# Patient Record
Sex: MALE | Race: WHITE | NOT HISPANIC OR LATINO
[De-identification: names, ages, dates, MRNs, and addresses within clinical notes are randomized per-mention and may not be internally consistent; named-entity substitution may affect disease eponyms.]

---

## 2021-02-03 ENCOUNTER — APPOINTMENT (OUTPATIENT)
Dept: INTERNAL MEDICINE | Facility: CLINIC | Age: 41
End: 2021-02-03

## 2021-03-02 ENCOUNTER — APPOINTMENT (OUTPATIENT)
Dept: INTERNAL MEDICINE | Facility: CLINIC | Age: 41
End: 2021-03-02
Payer: COMMERCIAL

## 2021-03-02 VITALS
OXYGEN SATURATION: 99 % | WEIGHT: 145.5 LBS | SYSTOLIC BLOOD PRESSURE: 99 MMHG | TEMPERATURE: 97.2 F | HEIGHT: 68 IN | DIASTOLIC BLOOD PRESSURE: 60 MMHG | HEART RATE: 53 BPM | BODY MASS INDEX: 22.05 KG/M2

## 2021-03-02 DIAGNOSIS — Z12.5 ENCOUNTER FOR SCREENING FOR MALIGNANT NEOPLASM OF PROSTATE: ICD-10-CM

## 2021-03-02 DIAGNOSIS — E03.9 HYPOTHYROIDISM, UNSPECIFIED: ICD-10-CM

## 2021-03-02 DIAGNOSIS — E78.9 DISORDER OF LIPOPROTEIN METABOLISM, UNSPECIFIED: ICD-10-CM

## 2021-03-02 DIAGNOSIS — R73.9 HYPERGLYCEMIA, UNSPECIFIED: ICD-10-CM

## 2021-03-02 DIAGNOSIS — Z20.822 CONTACT WITH AND (SUSPECTED) EXPOSURE TO COVID-19: ICD-10-CM

## 2021-03-02 PROBLEM — Z00.00 ENCOUNTER FOR PREVENTIVE HEALTH EXAMINATION: Status: ACTIVE | Noted: 2021-03-02

## 2021-03-02 PROCEDURE — 99385 PREV VISIT NEW AGE 18-39: CPT | Mod: 25

## 2021-03-02 PROCEDURE — 36415 COLL VENOUS BLD VENIPUNCTURE: CPT

## 2021-03-02 PROCEDURE — 99072 ADDL SUPL MATRL&STAF TM PHE: CPT

## 2021-03-02 NOTE — HEALTH RISK ASSESSMENT
[Patient declined mammogram] : Patient declined mammogram [Patient declined PAP Smear] : Patient declined PAP Smear [Patient declined bone density test] : Patient declined bone density test [Patient declined colonoscopy] : Patient declined colonoscopy [Good] : ~his/her~  mood as  good [] : No [No] : In the past 12 months have you used drugs other than those required for medical reasons? No [No falls in past year] : Patient reported no falls in the past year [0] : 2) Feeling down, depressed, or hopeless: Not at all (0) [[id number]] : 0 [Patient declined Low Dose CT Scan] : Patient declined Low Dose CT Scan [Patient declined Retinal Exam] : Patient declined Retinal Exam. [HIV Test offered] : HIV Test offered [Hepatitis C test offered] : Hepatitis C test offered

## 2021-03-02 NOTE — HISTORY OF PRESENT ILLNESS
[FreeTextEntry1] : np est [de-identified] : Mr. ANTHONY LOMBARDO is a 40 year male vegan, comes to the office for physical exam. Patient feels well and has no complaints at this time.

## 2021-03-03 LAB
25(OH)D3 SERPL-MCNC: 16.3 NG/ML
ALBUMIN SERPL ELPH-MCNC: 4.4 G/DL
ALP BLD-CCNC: 62 U/L
ALT SERPL-CCNC: 11 U/L
ANION GAP SERPL CALC-SCNC: 11 MMOL/L
AST SERPL-CCNC: 16 U/L
BASOPHILS # BLD AUTO: 0.04 K/UL
BASOPHILS NFR BLD AUTO: 0.8 %
BILIRUB SERPL-MCNC: 0.4 MG/DL
BUN SERPL-MCNC: 18 MG/DL
CALCIUM SERPL-MCNC: 9.8 MG/DL
CHLORIDE SERPL-SCNC: 102 MMOL/L
CHOLEST SERPL-MCNC: 158 MG/DL
CO2 SERPL-SCNC: 26 MMOL/L
CREAT SERPL-MCNC: 0.72 MG/DL
EOSINOPHIL # BLD AUTO: 0.12 K/UL
EOSINOPHIL NFR BLD AUTO: 2.4 %
ESTIMATED AVERAGE GLUCOSE: 94 MG/DL
FOLATE SERPL-MCNC: 12.3 NG/ML
GLUCOSE SERPL-MCNC: 92 MG/DL
HBA1C MFR BLD HPLC: 4.9 %
HCT VFR BLD CALC: 37.4 %
HCV AB SER QL: NONREACTIVE
HCV S/CO RATIO: 0.09 S/CO
HDLC SERPL-MCNC: 66 MG/DL
HGB BLD-MCNC: 13.1 G/DL
HIV1+2 AB SPEC QL IA.RAPID: NONREACTIVE
IMM GRANULOCYTES NFR BLD AUTO: 0.2 %
IRON SATN MFR SERPL: 27 %
IRON SERPL-MCNC: 75 UG/DL
LDLC SERPL CALC-MCNC: 75 MG/DL
LYMPHOCYTES # BLD AUTO: 1.84 K/UL
LYMPHOCYTES NFR BLD AUTO: 37 %
MAN DIFF?: NORMAL
MCHC RBC-ENTMCNC: 33.6 PG
MCHC RBC-ENTMCNC: 35 GM/DL
MCV RBC AUTO: 95.9 FL
MONOCYTES # BLD AUTO: 0.46 K/UL
MONOCYTES NFR BLD AUTO: 9.3 %
NEUTROPHILS # BLD AUTO: 2.5 K/UL
NEUTROPHILS NFR BLD AUTO: 50.3 %
NONHDLC SERPL-MCNC: 92 MG/DL
PLATELET # BLD AUTO: 279 K/UL
POTASSIUM SERPL-SCNC: 4.7 MMOL/L
PROT SERPL-MCNC: 7.5 G/DL
PSA SERPL-MCNC: 0.56 NG/ML
RBC # BLD: 3.9 M/UL
RBC # FLD: 12.3 %
SARS-COV-2 IGG SERPL IA-ACNC: 137 INDEX
SARS-COV-2 IGG SERPL QL IA: POSITIVE
SODIUM SERPL-SCNC: 139 MMOL/L
TIBC SERPL-MCNC: 277 UG/DL
TRIGL SERPL-MCNC: 87 MG/DL
TSH SERPL-ACNC: 2.91 UIU/ML
UIBC SERPL-MCNC: 203 UG/DL
VIT B12 SERPL-MCNC: 604 PG/ML
WBC # FLD AUTO: 4.97 K/UL

## 2021-05-28 ENCOUNTER — APPOINTMENT (OUTPATIENT)
Dept: INTERNAL MEDICINE | Facility: CLINIC | Age: 41
End: 2021-05-28
Payer: COMMERCIAL

## 2021-05-28 VITALS
TEMPERATURE: 97 F | DIASTOLIC BLOOD PRESSURE: 56 MMHG | HEART RATE: 63 BPM | HEIGHT: 68 IN | SYSTOLIC BLOOD PRESSURE: 96 MMHG | RESPIRATION RATE: 16 BRPM | BODY MASS INDEX: 22.43 KG/M2 | WEIGHT: 148 LBS

## 2021-05-28 DIAGNOSIS — H26.9 UNSPECIFIED CATARACT: ICD-10-CM

## 2021-05-28 PROCEDURE — 99072 ADDL SUPL MATRL&STAF TM PHE: CPT

## 2021-05-28 PROCEDURE — 99214 OFFICE O/P EST MOD 30 MIN: CPT

## 2021-05-28 NOTE — PLAN
[FreeTextEntry1] : Mr. ANTHONY LOMBARDO is a 40 year male comes to the office for preoperative evaluation. Patient denies fever, cough SOB. No other complaints at this time.  Patient has greater than 4 METS, is a low perioperative risk for Major adverse cardiac event for low risk procedure.No further testing indicated at this time. Patient is medically optimized for this procedure. \par \par Prior to appointment and during encounter with patient extensive medical records were reviewed including but not limited to, Hospital records records, out patient records, laboratory data and microbiology data \par In addition extensive time was also spent in reviewing diagnostic studies.\par \par Total encounter total time 30 mins\par >50% of time spent counseling/coordinating care\par \par Counseling included abnormal lab results, differential diagnoses, treatment options, risks and benefits, lifestyle changes, current condition, medications, and dose adjustments. \par The patient was interactive, attentive, asked questions, and verbalized understanding

## 2021-05-28 NOTE — HISTORY OF PRESENT ILLNESS
[No Pertinent Cardiac History] : no history of aortic stenosis, atrial fibrillation, coronary artery disease, recent myocardial infarction, or implantable device/pacemaker [No Pertinent Pulmonary History] : no history of asthma, COPD, sleep apnea, or smoking [No Adverse Anesthesia Reaction] : no adverse anesthesia reaction in self or family member [Chronic Anticoagulation] : no chronic anticoagulation [Chronic Kidney Disease] : no chronic kidney disease [Diabetes] : no diabetes [(Patient denies any chest pain, claudication, dyspnea on exertion, orthopnea, palpitations or syncope)] : Patient denies any chest pain, claudication, dyspnea on exertion, orthopnea, palpitations or syncope [Moderate (4-6 METs)] : Moderate (4-6 METs) [FreeTextEntry1] : Bilateral Cataract surgery [FreeTextEntry2] : 06/10/21 and 06/24/21 [FreeTextEntry3] : Dr. Townsend [FreeTextEntry4] : Mr. ANTHONY LOMBARDO is a 40 year male comes to the office for preoperative evaluation. Patient denies fever, cough SOB. No other complaints at this time.

## 2022-06-28 ENCOUNTER — TRANSCRIPTION ENCOUNTER (OUTPATIENT)
Age: 42
End: 2022-06-28

## 2022-06-28 ENCOUNTER — APPOINTMENT (OUTPATIENT)
Dept: INTERNAL MEDICINE | Facility: CLINIC | Age: 42
End: 2022-06-28
Payer: COMMERCIAL

## 2022-06-28 ENCOUNTER — OUTPATIENT (OUTPATIENT)
Dept: OUTPATIENT SERVICES | Facility: HOSPITAL | Age: 42
LOS: 1 days | End: 2022-06-28
Payer: COMMERCIAL

## 2022-06-28 ENCOUNTER — NON-APPOINTMENT (OUTPATIENT)
Age: 42
End: 2022-06-28

## 2022-06-28 VITALS
SYSTOLIC BLOOD PRESSURE: 104 MMHG | WEIGHT: 148 LBS | DIASTOLIC BLOOD PRESSURE: 72 MMHG | HEIGHT: 68 IN | BODY MASS INDEX: 22.43 KG/M2 | HEART RATE: 58 BPM

## 2022-06-28 DIAGNOSIS — Z20.828 CONTACT WITH AND (SUSPECTED) EXPOSURE TO OTHER VIRAL COMMUNICABLE DISEASES: ICD-10-CM

## 2022-06-28 LAB — SARS-COV-2 RNA SPEC QL NAA+PROBE: SIGNIFICANT CHANGE UP

## 2022-06-28 PROCEDURE — 99214 OFFICE O/P EST MOD 30 MIN: CPT | Mod: 25

## 2022-06-28 PROCEDURE — 93000 ELECTROCARDIOGRAM COMPLETE: CPT | Mod: 59

## 2022-06-28 PROCEDURE — 87635 SARS-COV-2 COVID-19 AMP PRB: CPT

## 2022-06-28 NOTE — ASSESSMENT
[Patient Optimized for Surgery] : Patient optimized for surgery [No Further Testing Recommended] : no further testing recommended [As per surgery] : as per surgery [FreeTextEntry4] : Mr. ANTHONY LOMBARDO is a 41 year male comes to the office for preoperative evaluation. Patient denies fever, cough SOB. No other complaints at this time.  Patient has greater than 4 METS, is a low perioperative risk for Major adverse cardiac event for low risk procedure.EKG reviewed. No further testing indicated at this time. Patient is medically optimized for this procedure.

## 2022-06-28 NOTE — HISTORY OF PRESENT ILLNESS
[No Pertinent Cardiac History] : no history of aortic stenosis, atrial fibrillation, coronary artery disease, recent myocardial infarction, or implantable device/pacemaker [No Pertinent Pulmonary History] : no history of asthma, COPD, sleep apnea, or smoking [No Adverse Anesthesia Reaction] : no adverse anesthesia reaction in self or family member [(Patient denies any chest pain, claudication, dyspnea on exertion, orthopnea, palpitations or syncope)] : Patient denies any chest pain, claudication, dyspnea on exertion, orthopnea, palpitations or syncope [Moderate (4-6 METs)] : Moderate (4-6 METs) [Chronic Anticoagulation] : no chronic anticoagulation [Chronic Kidney Disease] : no chronic kidney disease [Diabetes] : no diabetes [FreeTextEntry1] : retinal surgery [FreeTextEntry2] : 06/29/22 [FreeTextEntry3] : Dr. Obando [FreeTextEntry4] : Mr. ANTHONY LOMBARDO is a 41 year male comes to the office for preoperative evaluation. Patient denies fever, cough SOB. No other complaints at this time.

## 2022-06-28 NOTE — PHYSICAL EXAM
today [No Acute Distress] : no acute distress [Well Nourished] : well nourished [Well Developed] : well developed [Well-Appearing] : well-appearing [Normal Sclera/Conjunctiva] : normal sclera/conjunctiva [PERRL] : pupils equal round and reactive to light [EOMI] : extraocular movements intact [Normal Outer Ear/Nose] : the outer ears and nose were normal in appearance [Normal Oropharynx] : the oropharynx was normal [No JVD] : no jugular venous distention [No Lymphadenopathy] : no lymphadenopathy [Supple] : supple [No Respiratory Distress] : no respiratory distress  [No Accessory Muscle Use] : no accessory muscle use [Clear to Auscultation] : lungs were clear to auscultation bilaterally [Normal Rate] : normal rate  [Regular Rhythm] : with a regular rhythm [Normal S1, S2] : normal S1 and S2 [No Carotid Bruits] : no carotid bruits [No Abdominal Bruit] : a ~M bruit was not heard ~T in the abdomen [No Varicosities] : no varicosities [No Edema] : there was no peripheral edema [No Palpable Aorta] : no palpable aorta [No Extremity Clubbing/Cyanosis] : no extremity clubbing/cyanosis [Soft] : abdomen soft [Non Tender] : non-tender [Non-distended] : non-distended [No HSM] : no HSM [Normal Bowel Sounds] : normal bowel sounds [Normal Posterior Cervical Nodes] : no posterior cervical lymphadenopathy [Normal Anterior Cervical Nodes] : no anterior cervical lymphadenopathy [No CVA Tenderness] : no CVA  tenderness [No Spinal Tenderness] : no spinal tenderness [No Joint Swelling] : no joint swelling [Grossly Normal Strength/Tone] : grossly normal strength/tone [No Rash] : no rash [Coordination Grossly Intact] : coordination grossly intact [No Focal Deficits] : no focal deficits [Normal Gait] : normal gait [Normal Affect] : the affect was normal [Normal Insight/Judgement] : insight and judgment were intact

## 2022-06-28 NOTE — PLAN
[FreeTextEntry1] : Mr. ANTHONY LOMBARDO is a 41 year male comes to the office for preoperative evaluation. Patient denies fever, cough SOB. No other complaints at this time.  Patient has greater than 4 METS, is a low perioperative risk for Major adverse cardiac event for low risk procedure. EKG reviewed. No further testing indicated at this time. Patient is medically optimized for this procedure. \par \par Unable to assess updated blood work at this time, as this is an emergency surgery, and it is planned for tomorrow 06/28/22.\par Blood work reviewed from 03/21/21, it was unremarkable. \par \par Prior to appointment and during encounter with patient extensive medical records were reviewed including but not limited to, Hospital records records, out patient records, laboratory data and microbiology data \par In addition extensive time was also spent in reviewing diagnostic studies.\par \par Total encounter total time 30 mins\par >50% of time spent counseling/coordinating care\par \par Counseling included abnormal lab results, differential diagnoses, treatment options, risks and benefits, lifestyle changes, current condition, medications, and dose adjustments. \par The patient was interactive, attentive, asked questions, and verbalized understanding

## 2022-06-29 ENCOUNTER — OUTPATIENT (OUTPATIENT)
Dept: OUTPATIENT SERVICES | Facility: HOSPITAL | Age: 42
LOS: 1 days | End: 2022-06-29
Payer: COMMERCIAL

## 2022-06-29 ENCOUNTER — TRANSCRIPTION ENCOUNTER (OUTPATIENT)
Age: 42
End: 2022-06-29

## 2022-06-29 VITALS
OXYGEN SATURATION: 98 % | HEART RATE: 50 BPM | SYSTOLIC BLOOD PRESSURE: 89 MMHG | DIASTOLIC BLOOD PRESSURE: 54 MMHG | RESPIRATION RATE: 17 BRPM

## 2022-06-29 VITALS
DIASTOLIC BLOOD PRESSURE: 65 MMHG | WEIGHT: 146.39 LBS | TEMPERATURE: 99 F | HEART RATE: 58 BPM | HEIGHT: 68 IN | SYSTOLIC BLOOD PRESSURE: 98 MMHG | OXYGEN SATURATION: 100 % | RESPIRATION RATE: 16 BRPM

## 2022-06-29 DIAGNOSIS — H33.011 RETINAL DETACHMENT WITH SINGLE BREAK, RIGHT EYE: ICD-10-CM

## 2022-06-29 DIAGNOSIS — Z98.41 CATARACT EXTRACTION STATUS, RIGHT EYE: Chronic | ICD-10-CM

## 2022-06-29 PROCEDURE — 67108 REPAIR DETACHED RETINA: CPT | Mod: RT

## 2022-06-29 PROCEDURE — C1814: CPT

## 2022-06-29 PROCEDURE — C1889: CPT

## 2022-06-29 DEVICE — OIL SI SILIKON 1000 8.5ML: Type: IMPLANTABLE DEVICE | Site: RIGHT | Status: FUNCTIONAL

## 2022-06-29 DEVICE — LASER PROBE 23G CONSTELLATION: Type: IMPLANTABLE DEVICE | Site: RIGHT | Status: FUNCTIONAL

## 2022-06-29 NOTE — ASU DISCHARGE PLAN (ADULT/PEDIATRIC) - CARE PROVIDER_API CALL
Aaron Obando)  Ophthalmology  [address], [address]  [place] [address]  Phone: [phone]  Fax: [phone]  Scheduled Appointment: 06/30/2022

## 2022-06-29 NOTE — ASU PATIENT PROFILE, ADULT - FALL HARM RISK - HARM RISK INTERVENTIONS

## 2022-06-29 NOTE — ASU DISCHARGE PLAN (ADULT/PEDIATRIC) - NS MD DC FALL RISK RISK
For information on Fall & Injury Prevention, visit: [email or web address] OR  [email or web address] OR  https://www.cdc.gov/steadi/patient.html

## 2022-06-30 NOTE — PLAN
[FreeTextEntry1] : Mr. ANTHONY LOMBARDO is a 41 year male comes to the office for preoperative evaluation. Patient denies fever, cough SOB. No other complaints at this time.  Patient has greater than 4 METS, is a low perioperative risk for Major adverse cardiac event for low risk procedure.No further testing indicated at this time. Patient is medically optimized for this procedure. \par \par Prior to appointment and during encounter with patient extensive medical records were reviewed including but not limited to, Hospital records records, out patient records, laboratory data and microbiology data \par In addition extensive time was also spent in reviewing diagnostic studies.\par \par Total encounter total time 30 mins\par >50% of time spent counseling/coordinating care\par \par Counseling included abnormal lab results, differential diagnoses, treatment options, risks and benefits, lifestyle changes, current condition, medications, and dose adjustments. \par The patient was interactive, attentive, asked questions, and verbalized understanding

## 2022-06-30 NOTE — ASSESSMENT
[Patient Optimized for Surgery] : Patient optimized for surgery [No Further Testing Recommended] : no further testing recommended [As per surgery] : as per surgery [FreeTextEntry4] : Mr. ANTHONY LOMBARDO is a 40 year male comes to the office for preoperative evaluation. Patient denies fever, cough SOB. No other complaints at this time.  Patient has greater than 4 METS, is a low perioperative risk for Major adverse cardiac event for low risk procedure.No further testing indicated at this time. Patient is medically optimized for this procedure.

## 2022-06-30 NOTE — HISTORY OF PRESENT ILLNESS
[No Pertinent Cardiac History] : no history of aortic stenosis, atrial fibrillation, coronary artery disease, recent myocardial infarction, or implantable device/pacemaker [No Pertinent Pulmonary History] : no history of asthma, COPD, sleep apnea, or smoking [No Adverse Anesthesia Reaction] : no adverse anesthesia reaction in self or family member [(Patient denies any chest pain, claudication, dyspnea on exertion, orthopnea, palpitations or syncope)] : Patient denies any chest pain, claudication, dyspnea on exertion, orthopnea, palpitations or syncope [Moderate (4-6 METs)] : Moderate (4-6 METs) [Chronic Anticoagulation] : no chronic anticoagulation [Chronic Kidney Disease] : no chronic kidney disease [Diabetes] : no diabetes [FreeTextEntry1] : retina surgery [FreeTextEntry2] : 06/29/22 [FreeTextEntry3] : Dr. Townsend [FreeTextEntry4] : Mr. ANTHONY LOMBARDO is a 41 year male comes to the office for preoperative evaluation. Patient denies fever, cough SOB. No other complaints at this time.

## 2022-10-03 PROBLEM — Z78.9 OTHER SPECIFIED HEALTH STATUS: Chronic | Status: ACTIVE | Noted: 2022-06-29

## 2022-10-14 ENCOUNTER — APPOINTMENT (OUTPATIENT)
Dept: DERMATOLOGY | Facility: CLINIC | Age: 42
End: 2022-10-14

## 2022-10-14 PROCEDURE — 99203 OFFICE O/P NEW LOW 30 MIN: CPT

## 2022-10-24 ENCOUNTER — APPOINTMENT (OUTPATIENT)
Dept: INTERNAL MEDICINE | Facility: CLINIC | Age: 42
End: 2022-10-24

## 2022-10-24 VITALS
SYSTOLIC BLOOD PRESSURE: 106 MMHG | HEART RATE: 69 BPM | DIASTOLIC BLOOD PRESSURE: 64 MMHG | BODY MASS INDEX: 22.43 KG/M2 | HEIGHT: 68 IN | WEIGHT: 148 LBS | RESPIRATION RATE: 16 BRPM

## 2022-10-24 DIAGNOSIS — H33.20 SEROUS RETINAL DETACHMENT, UNSPECIFIED EYE: ICD-10-CM

## 2022-10-24 DIAGNOSIS — Z01.818 ENCOUNTER FOR OTHER PREPROCEDURAL EXAMINATION: ICD-10-CM

## 2022-10-24 PROCEDURE — 99214 OFFICE O/P EST MOD 30 MIN: CPT | Mod: 25

## 2022-10-24 PROCEDURE — 36415 COLL VENOUS BLD VENIPUNCTURE: CPT

## 2022-10-24 NOTE — PHYSICAL EXAM
[No Acute Distress] : no acute distress [Well Nourished] : well nourished [Well Developed] : well developed [Well-Appearing] : well-appearing [Normal Sclera/Conjunctiva] : normal sclera/conjunctiva [PERRL] : pupils equal round and reactive to light [EOMI] : extraocular movements intact [Normal Outer Ear/Nose] : the outer ears and nose were normal in appearance [Normal Oropharynx] : the oropharynx was normal [No JVD] : no jugular venous distention [No Lymphadenopathy] : no lymphadenopathy [Supple] : supple [No Respiratory Distress] : no respiratory distress  [No Accessory Muscle Use] : no accessory muscle use [Clear to Auscultation] : lungs were clear to auscultation bilaterally [Normal Rate] : normal rate  [Regular Rhythm] : with a regular rhythm [No Carotid Bruits] : no carotid bruits [Normal S1, S2] : normal S1 and S2 [No Abdominal Bruit] : a ~M bruit was not heard ~T in the abdomen [No Varicosities] : no varicosities [No Edema] : there was no peripheral edema [No Palpable Aorta] : no palpable aorta [No Extremity Clubbing/Cyanosis] : no extremity clubbing/cyanosis [Soft] : abdomen soft [Non Tender] : non-tender [Non-distended] : non-distended [No HSM] : no HSM [Normal Bowel Sounds] : normal bowel sounds [Normal Posterior Cervical Nodes] : no posterior cervical lymphadenopathy [Normal Anterior Cervical Nodes] : no anterior cervical lymphadenopathy [No CVA Tenderness] : no CVA  tenderness [No Spinal Tenderness] : no spinal tenderness [No Joint Swelling] : no joint swelling [Grossly Normal Strength/Tone] : grossly normal strength/tone [No Rash] : no rash [Coordination Grossly Intact] : coordination grossly intact [No Focal Deficits] : no focal deficits [Normal Gait] : normal gait [Normal Affect] : the affect was normal [Normal Insight/Judgement] : insight and judgment were intact

## 2022-10-25 LAB
ALBUMIN SERPL ELPH-MCNC: 4.7 G/DL
ALP BLD-CCNC: 64 U/L
ALT SERPL-CCNC: 13 U/L
ANION GAP SERPL CALC-SCNC: 12 MMOL/L
AST SERPL-CCNC: 20 U/L
BASOPHILS # BLD AUTO: 0.05 K/UL
BASOPHILS NFR BLD AUTO: 0.8 %
BILIRUB SERPL-MCNC: 0.7 MG/DL
BUN SERPL-MCNC: 10 MG/DL
CALCIUM SERPL-MCNC: 9.9 MG/DL
CHLORIDE SERPL-SCNC: 104 MMOL/L
CO2 SERPL-SCNC: 26 MMOL/L
CREAT SERPL-MCNC: 0.57 MG/DL
EGFR: 127 ML/MIN/1.73M2
EOSINOPHIL # BLD AUTO: 0.05 K/UL
EOSINOPHIL NFR BLD AUTO: 0.8 %
GLUCOSE SERPL-MCNC: 86 MG/DL
HCT VFR BLD CALC: 38.9 %
HGB BLD-MCNC: 13.4 G/DL
IMM GRANULOCYTES NFR BLD AUTO: 0.2 %
LYMPHOCYTES # BLD AUTO: 2.13 K/UL
LYMPHOCYTES NFR BLD AUTO: 32.1 %
MAN DIFF?: NORMAL
MCHC RBC-ENTMCNC: 32.3 PG
MCHC RBC-ENTMCNC: 34.4 GM/DL
MCV RBC AUTO: 93.7 FL
MONOCYTES # BLD AUTO: 0.6 K/UL
MONOCYTES NFR BLD AUTO: 9 %
NEUTROPHILS # BLD AUTO: 3.79 K/UL
NEUTROPHILS NFR BLD AUTO: 57.1 %
PLATELET # BLD AUTO: 289 K/UL
POTASSIUM SERPL-SCNC: 4.6 MMOL/L
PROT SERPL-MCNC: 7 G/DL
RBC # BLD: 4.15 M/UL
RBC # FLD: 11.8 %
SODIUM SERPL-SCNC: 141 MMOL/L
WBC # FLD AUTO: 6.63 K/UL

## 2022-10-25 NOTE — ASSESSMENT
[Patient Optimized for Surgery] : Patient optimized for surgery [No Further Testing Recommended] : no further testing recommended [As per surgery] : as per surgery [FreeTextEntry4] : Mr. ANTHONY LOMBARDO is a 41 year male comes to the office for preoperative evaluation. Patient denies fever, cough SOB. No other complaints at this time.  Patient has greater than 4 METS, is a low perioperative risk for Major adverse cardiac event for low risk procedure. Blood work reviewed. Previous EKG reviewed. No further testing indicated at this time. Patient is medically optimized for this procedure.

## 2022-10-25 NOTE — HISTORY OF PRESENT ILLNESS
[No Pertinent Cardiac History] : no history of aortic stenosis, atrial fibrillation, coronary artery disease, recent myocardial infarction, or implantable device/pacemaker [No Pertinent Pulmonary History] : no history of asthma, COPD, sleep apnea, or smoking [No Adverse Anesthesia Reaction] : no adverse anesthesia reaction in self or family member [(Patient denies any chest pain, claudication, dyspnea on exertion, orthopnea, palpitations or syncope)] : Patient denies any chest pain, claudication, dyspnea on exertion, orthopnea, palpitations or syncope [Moderate (4-6 METs)] : Moderate (4-6 METs) [Chronic Anticoagulation] : no chronic anticoagulation [Chronic Kidney Disease] : no chronic kidney disease [Diabetes] : no diabetes [FreeTextEntry1] : retinal surgery [FreeTextEntry2] : 11/07/22 [FreeTextEntry3] : Dr. Lo [FreeTextEntry4] : Mr. ANTHONY LOMBARDO is a 41 year male comes to the office for preoperative evaluation. Patient denies fever, cough SOB. No other complaints at this time.

## 2022-10-25 NOTE — PLAN
[FreeTextEntry1] : Mr. ANTHONY LOMBARDO is a 41 year male comes to the office for preoperative evaluation. Patient denies fever, cough SOB. No other complaints at this time.  Patient has greater than 4 METS, is a low perioperative risk for Major adverse cardiac event for low risk procedure. EKG reviewed. No further testing indicated at this time. Patient is medically optimized for this procedure. \par \par \par Prior to appointment and during encounter with patient extensive medical records were reviewed including but not limited to, Hospital records records, out patient records, laboratory data and microbiology data \par In addition extensive time was also spent in reviewing diagnostic studies.\par \par Total encounter total time 30 mins\par >50% of time spent counseling/coordinating care\par \par Counseling included abnormal lab results, differential diagnoses, treatment options, risks and benefits, lifestyle changes, current condition, medications, and dose adjustments. \par The patient was interactive, attentive, asked questions, and verbalized understanding

## 2022-11-03 ENCOUNTER — OFFICE (OUTPATIENT)
Dept: URBAN - METROPOLITAN AREA CLINIC 94 | Facility: CLINIC | Age: 42
Setting detail: OPHTHALMOLOGY
End: 2022-11-03
Payer: COMMERCIAL

## 2022-11-03 DIAGNOSIS — Z20.822: ICD-10-CM

## 2022-11-03 DIAGNOSIS — Z01.812: ICD-10-CM

## 2022-11-03 PROCEDURE — 99211 OFF/OP EST MAY X REQ PHY/QHP: CPT | Performed by: OPHTHALMOLOGY

## 2022-11-07 ENCOUNTER — ASC (OUTPATIENT)
Dept: URBAN - METROPOLITAN AREA SURGERY 8 | Facility: SURGERY | Age: 42
Setting detail: OPHTHALMOLOGY
End: 2022-11-07
Payer: COMMERCIAL

## 2022-11-07 DIAGNOSIS — H33.021: ICD-10-CM

## 2022-11-07 PROCEDURE — 67113 REPAIR RETINAL DETACH CPLX: CPT | Performed by: OPHTHALMOLOGY

## 2022-11-08 ENCOUNTER — OFFICE (OUTPATIENT)
Dept: URBAN - METROPOLITAN AREA CLINIC 115 | Facility: CLINIC | Age: 42
Setting detail: OPHTHALMOLOGY
End: 2022-11-08
Payer: COMMERCIAL

## 2022-11-08 DIAGNOSIS — H33.021: ICD-10-CM

## 2022-11-08 DIAGNOSIS — H33.311: ICD-10-CM

## 2022-11-08 DIAGNOSIS — H35.411: ICD-10-CM

## 2022-11-08 DIAGNOSIS — H43.812: ICD-10-CM

## 2022-11-08 PROCEDURE — 99024 POSTOP FOLLOW-UP VISIT: CPT | Performed by: OPHTHALMOLOGY

## 2022-11-08 ASSESSMENT — REFRACTION_AUTOREFRACTION
OS_SPHERE: +0.50
OS_CYLINDER: -0.25
OD_SPHERE: 0.00
OD_CYLINDER: -0.25
OD_AXIS: 154
OS_AXIS: 104

## 2022-11-08 ASSESSMENT — CONFRONTATIONAL VISUAL FIELD TEST (CVF)
OD_FINDINGS: FULL
OS_FINDINGS: FULL

## 2022-11-08 ASSESSMENT — SPHEQUIV_DERIVED
OS_SPHEQUIV: 0.375
OD_SPHEQUIV: -0.125

## 2022-11-08 ASSESSMENT — VISUAL ACUITY
OS_BCVA: 20/CF 2FT
OD_BCVA: 20/20-1

## 2022-11-08 ASSESSMENT — TONOMETRY
OS_IOP_MMHG: 19
OD_IOP_MMHG: 11

## 2022-11-12 PROBLEM — Z01.812 ENCOUNTER FOR PREPROCEDURAL LABORATORY EXAMINATION: Status: ACTIVE | Noted: 2022-11-03

## 2022-11-12 ASSESSMENT — REFRACTION_AUTOREFRACTION
OD_CYLINDER: -0.25
OS_CYLINDER: -0.25
OS_AXIS: 104
OD_AXIS: 154
OS_SPHERE: +0.50
OD_SPHERE: 0.00

## 2022-11-12 ASSESSMENT — SPHEQUIV_DERIVED
OD_SPHEQUIV: -0.125
OS_SPHEQUIV: 0.375

## 2022-11-12 ASSESSMENT — VISUAL ACUITY
OS_BCVA: 20/CF 2FT
OD_BCVA: 20/20-1

## 2022-11-17 ENCOUNTER — OFFICE (OUTPATIENT)
Dept: URBAN - METROPOLITAN AREA CLINIC 115 | Facility: CLINIC | Age: 42
Setting detail: OPHTHALMOLOGY
End: 2022-11-17
Payer: COMMERCIAL

## 2022-11-17 DIAGNOSIS — H33.021: ICD-10-CM

## 2022-11-17 PROCEDURE — 99024 POSTOP FOLLOW-UP VISIT: CPT | Performed by: OPHTHALMOLOGY

## 2022-11-17 ASSESSMENT — REFRACTION_AUTOREFRACTION
OS_SPHERE: +0.50
OD_AXIS: 154
OD_CYLINDER: -0.25
OD_SPHERE: 0.00
OS_CYLINDER: -0.25
OS_AXIS: 104

## 2022-11-17 ASSESSMENT — VISUAL ACUITY
OD_BCVA: 20/20
OS_BCVA: 20/200

## 2022-11-17 ASSESSMENT — SPHEQUIV_DERIVED
OS_SPHEQUIV: 0.375
OD_SPHEQUIV: -0.125

## 2022-11-17 ASSESSMENT — TONOMETRY
OD_IOP_MMHG: 14
OS_IOP_MMHG: 14

## 2022-11-17 ASSESSMENT — CONFRONTATIONAL VISUAL FIELD TEST (CVF)
OD_FINDINGS: FULL
OS_FINDINGS: FULL

## 2022-12-03 ENCOUNTER — OFFICE (OUTPATIENT)
Dept: URBAN - METROPOLITAN AREA CLINIC 88 | Facility: CLINIC | Age: 42
Setting detail: OPHTHALMOLOGY
End: 2022-12-03
Payer: COMMERCIAL

## 2022-12-03 DIAGNOSIS — H33.021: ICD-10-CM

## 2022-12-03 PROCEDURE — 99024 POSTOP FOLLOW-UP VISIT: CPT | Performed by: OPHTHALMOLOGY

## 2022-12-03 ASSESSMENT — REFRACTION_AUTOREFRACTION
OS_CYLINDER: -0.25
OD_AXIS: 154
OS_SPHERE: +0.50
OD_CYLINDER: -0.25
OS_AXIS: 104
OD_SPHERE: 0.00

## 2022-12-03 ASSESSMENT — VISUAL ACUITY
OD_BCVA: 20/20-2
OS_BCVA: 20/450

## 2022-12-03 ASSESSMENT — CONFRONTATIONAL VISUAL FIELD TEST (CVF)
OS_FINDINGS: FULL
OD_FINDINGS: FULL

## 2022-12-03 ASSESSMENT — SPHEQUIV_DERIVED
OD_SPHEQUIV: -0.125
OS_SPHEQUIV: 0.375

## 2022-12-03 ASSESSMENT — TONOMETRY: OS_IOP_MMHG: 17

## 2022-12-08 ENCOUNTER — OFFICE (OUTPATIENT)
Dept: URBAN - METROPOLITAN AREA CLINIC 115 | Facility: CLINIC | Age: 42
Setting detail: OPHTHALMOLOGY
End: 2022-12-08
Payer: COMMERCIAL

## 2022-12-08 DIAGNOSIS — H33.021: ICD-10-CM

## 2022-12-08 PROCEDURE — 99024 POSTOP FOLLOW-UP VISIT: CPT | Performed by: OPHTHALMOLOGY

## 2022-12-08 ASSESSMENT — REFRACTION_AUTOREFRACTION
OS_AXIS: 104
OS_CYLINDER: -0.25
OS_SPHERE: +0.50
OD_SPHERE: 0.00
OD_AXIS: 154
OD_CYLINDER: -0.25

## 2022-12-08 ASSESSMENT — TONOMETRY
OD_IOP_MMHG: 11
OS_IOP_MMHG: 17

## 2022-12-08 ASSESSMENT — SPHEQUIV_DERIVED
OD_SPHEQUIV: -0.125
OS_SPHEQUIV: 0.375

## 2022-12-08 ASSESSMENT — VISUAL ACUITY
OS_BCVA: 20/150
OD_BCVA: 20/25+1

## 2023-01-28 ENCOUNTER — OFFICE (OUTPATIENT)
Dept: URBAN - METROPOLITAN AREA CLINIC 12 | Facility: CLINIC | Age: 43
Setting detail: OPHTHALMOLOGY
End: 2023-01-28
Payer: COMMERCIAL

## 2023-01-28 DIAGNOSIS — H43.812: ICD-10-CM

## 2023-01-28 DIAGNOSIS — H33.311: ICD-10-CM

## 2023-01-28 DIAGNOSIS — Z96.1: ICD-10-CM

## 2023-01-28 DIAGNOSIS — H35.411: ICD-10-CM

## 2023-01-28 DIAGNOSIS — H26.491: ICD-10-CM

## 2023-01-28 DIAGNOSIS — H33.021: ICD-10-CM

## 2023-01-28 PROCEDURE — 92250 FUNDUS PHOTOGRAPHY W/I&R: CPT | Performed by: OPTOMETRIST

## 2023-01-28 PROCEDURE — 92014 COMPRE OPH EXAM EST PT 1/>: CPT | Performed by: OPTOMETRIST

## 2023-01-28 ASSESSMENT — AXIALLENGTH_DERIVED
OS_AL: 22.5786
OS_AL: 22.5786

## 2023-01-28 ASSESSMENT — REFRACTION_MANIFEST
OS_AXIS: 085
OD_AXIS: 180
OS_SPHERE: +0.25
OS_CYLINDER: -0.25
OD_CYLINDER: -4.00
OD_SPHERE: PLANO
OS_VA1: 20/20

## 2023-01-28 ASSESSMENT — SPHEQUIV_DERIVED
OS_SPHEQUIV: 0.125
OS_SPHEQUIV: 0.125

## 2023-01-28 ASSESSMENT — CORNEAL SURGICAL SCARRING: OD_SCARRING: CENTRAL

## 2023-01-28 ASSESSMENT — KERATOMETRY
OS_K2POWER_DIOPTERS: 46.25
OS_K1POWER_DIOPTERS: 46.25
OS_AXISANGLE_DEGREES: 090
OD_K1POWER_DIOPTERS: 45.25
OD_K2POWER_DIOPTERS: 50.00
OD_AXISANGLE_DEGREES: 077

## 2023-01-28 ASSESSMENT — REFRACTION_AUTOREFRACTION
OS_SPHERE: +0.25
OD_AXIS: 178
OD_SPHERE: PLANO
OS_CYLINDER: -0.25
OS_AXIS: 087
OD_CYLINDER: -4.00

## 2023-01-28 ASSESSMENT — CONFRONTATIONAL VISUAL FIELD TEST (CVF)
OS_FINDINGS: FULL
OD_FINDINGS: FULL

## 2023-01-28 ASSESSMENT — VISUAL ACUITY
OD_BCVA: 20/20
OS_BCVA: 20/250

## 2023-01-28 ASSESSMENT — TONOMETRY: OS_IOP_MMHG: 17

## 2023-02-09 ENCOUNTER — OFFICE (OUTPATIENT)
Dept: URBAN - METROPOLITAN AREA CLINIC 115 | Facility: CLINIC | Age: 43
Setting detail: OPHTHALMOLOGY
End: 2023-02-09
Payer: COMMERCIAL

## 2023-02-09 DIAGNOSIS — H20.00: ICD-10-CM

## 2023-02-09 DIAGNOSIS — H18.421: ICD-10-CM

## 2023-02-09 DIAGNOSIS — H33.021: ICD-10-CM

## 2023-02-09 PROBLEM — H52.7 REFRACTIVE ERROR: Status: ACTIVE | Noted: 2023-01-28

## 2023-02-09 PROCEDURE — 92134 CPTRZ OPH DX IMG PST SGM RTA: CPT | Performed by: OPHTHALMOLOGY

## 2023-02-09 PROCEDURE — 92012 INTRM OPH EXAM EST PATIENT: CPT | Performed by: OPHTHALMOLOGY

## 2023-02-09 ASSESSMENT — KERATOMETRY
OD_K2POWER_DIOPTERS: 50.00
OS_AXISANGLE_DEGREES: 090
OS_K1POWER_DIOPTERS: 46.25
OD_K1POWER_DIOPTERS: 45.25
OD_AXISANGLE_DEGREES: 077
OS_K2POWER_DIOPTERS: 46.25

## 2023-02-09 ASSESSMENT — TONOMETRY: OS_IOP_MMHG: 19

## 2023-02-09 ASSESSMENT — VISUAL ACUITY
OD_BCVA: 20/20
OS_BCVA: 20/250

## 2023-02-09 ASSESSMENT — REFRACTION_AUTOREFRACTION
OS_CYLINDER: -0.25
OD_CYLINDER: -4.00
OD_SPHERE: PLANO
OS_SPHERE: +0.25
OS_AXIS: 087
OD_AXIS: 178

## 2023-02-09 ASSESSMENT — AXIALLENGTH_DERIVED: OS_AL: 22.5786

## 2023-02-09 ASSESSMENT — CONFRONTATIONAL VISUAL FIELD TEST (CVF)
OS_FINDINGS: FULL
OD_FINDINGS: FULL

## 2023-02-09 ASSESSMENT — SPHEQUIV_DERIVED: OS_SPHEQUIV: 0.125

## 2023-02-13 ENCOUNTER — OFFICE (OUTPATIENT)
Dept: URBAN - METROPOLITAN AREA CLINIC 115 | Facility: CLINIC | Age: 43
Setting detail: OPHTHALMOLOGY
End: 2023-02-13
Payer: COMMERCIAL

## 2023-02-13 DIAGNOSIS — H20.00: ICD-10-CM

## 2023-02-13 DIAGNOSIS — H16.8: ICD-10-CM

## 2023-02-13 DIAGNOSIS — H16.221: ICD-10-CM

## 2023-02-13 PROCEDURE — 65778 COVER EYE W/MEMBRANE: CPT | Performed by: OPHTHALMOLOGY

## 2023-02-13 PROCEDURE — 92012 INTRM OPH EXAM EST PATIENT: CPT | Performed by: OPHTHALMOLOGY

## 2023-02-13 ASSESSMENT — REFRACTION_AUTOREFRACTION
OD_AXIS: 178
OD_SPHERE: UTP
OD_CYLINDER: -4.00
OS_AXIS: 000
OS_SPHERE: 0.00
OS_CYLINDER: 0.00

## 2023-02-13 ASSESSMENT — AXIALLENGTH_DERIVED: OS_AL: 22.6233

## 2023-02-13 ASSESSMENT — TONOMETRY: OS_IOP_MMHG: 15

## 2023-02-13 ASSESSMENT — KERATOMETRY
OS_AXISANGLE_DEGREES: 090
OD_AXISANGLE_DEGREES: 077
OD_K1POWER_DIOPTERS: 45.25
OS_K2POWER_DIOPTERS: 46.25
OS_K1POWER_DIOPTERS: 46.25
OD_K2POWER_DIOPTERS: 50.00

## 2023-02-13 ASSESSMENT — VISUAL ACUITY
OS_BCVA: 20/250
OD_BCVA: 20/20

## 2023-02-13 ASSESSMENT — SUPERFICIAL PUNCTATE KERATITIS (SPK): OD_SPK: 2+

## 2023-02-13 ASSESSMENT — CONFRONTATIONAL VISUAL FIELD TEST (CVF)
OD_FINDINGS: FULL
OS_FINDINGS: FULL

## 2023-02-13 ASSESSMENT — SPHEQUIV_DERIVED: OS_SPHEQUIV: 0

## 2023-02-21 ENCOUNTER — RX ONLY (RX ONLY)
Age: 43
End: 2023-02-21

## 2023-02-21 ENCOUNTER — OFFICE (OUTPATIENT)
Dept: URBAN - METROPOLITAN AREA CLINIC 115 | Facility: CLINIC | Age: 43
Setting detail: OPHTHALMOLOGY
End: 2023-02-21
Payer: COMMERCIAL

## 2023-02-21 DIAGNOSIS — H16.8: ICD-10-CM

## 2023-02-21 DIAGNOSIS — H16.221: ICD-10-CM

## 2023-02-21 DIAGNOSIS — H20.00: ICD-10-CM

## 2023-02-21 PROCEDURE — 92012 INTRM OPH EXAM EST PATIENT: CPT | Performed by: OPHTHALMOLOGY

## 2023-02-21 ASSESSMENT — REFRACTION_AUTOREFRACTION
OS_CYLINDER: -0.25
OD_CYLINDER: -1.75
OS_AXIS: 092
OD_SPHERE: +2.25
OD_AXIS: 099
OS_SPHERE: +0.25

## 2023-02-21 ASSESSMENT — KERATOMETRY
OS_AXISANGLE_DEGREES: 090
OS_K1POWER_DIOPTERS: 46.25
OD_K1POWER_DIOPTERS: 45.25
OD_K2POWER_DIOPTERS: 50.00
OD_AXISANGLE_DEGREES: 077
OS_K2POWER_DIOPTERS: 46.25

## 2023-02-21 ASSESSMENT — CONFRONTATIONAL VISUAL FIELD TEST (CVF)
OD_FINDINGS: FULL
OS_FINDINGS: FULL

## 2023-02-21 ASSESSMENT — SPHEQUIV_DERIVED
OS_SPHEQUIV: 0.125
OD_SPHEQUIV: 1.375

## 2023-02-21 ASSESSMENT — VISUAL ACUITY
OS_BCVA: 20/200
OD_BCVA: 20/20-1

## 2023-02-21 ASSESSMENT — AXIALLENGTH_DERIVED
OD_AL: 21.7051
OS_AL: 22.5786

## 2023-02-21 ASSESSMENT — SUPERFICIAL PUNCTATE KERATITIS (SPK): OD_SPK: 2+

## 2023-02-28 ENCOUNTER — OFFICE (OUTPATIENT)
Dept: URBAN - METROPOLITAN AREA CLINIC 115 | Facility: CLINIC | Age: 43
Setting detail: OPHTHALMOLOGY
End: 2023-02-28
Payer: COMMERCIAL

## 2023-02-28 DIAGNOSIS — H16.8: ICD-10-CM

## 2023-02-28 DIAGNOSIS — H16.221: ICD-10-CM

## 2023-02-28 DIAGNOSIS — H20.00: ICD-10-CM

## 2023-02-28 PROCEDURE — 92012 INTRM OPH EXAM EST PATIENT: CPT | Performed by: OPHTHALMOLOGY

## 2023-02-28 ASSESSMENT — CONFRONTATIONAL VISUAL FIELD TEST (CVF)
OS_FINDINGS: FULL
OD_FINDINGS: FULL

## 2023-02-28 ASSESSMENT — SUPERFICIAL PUNCTATE KERATITIS (SPK): OD_SPK: T 1+

## 2023-02-28 ASSESSMENT — REFRACTION_AUTOREFRACTION
OD_CYLINDER: -1.00
OD_AXIS: 092
OD_SPHERE: +1.75
OS_AXIS: 106
OS_SPHERE: +0.25
OS_CYLINDER: -0.25

## 2023-02-28 ASSESSMENT — VISUAL ACUITY
OD_BCVA: 20/20-1
OS_BCVA: 20/150

## 2023-02-28 ASSESSMENT — SPHEQUIV_DERIVED
OD_SPHEQUIV: 1.25
OS_SPHEQUIV: 0.125

## 2023-03-06 ENCOUNTER — OFFICE (OUTPATIENT)
Dept: URBAN - METROPOLITAN AREA CLINIC 115 | Facility: CLINIC | Age: 43
Setting detail: OPHTHALMOLOGY
End: 2023-03-06
Payer: COMMERCIAL

## 2023-03-06 DIAGNOSIS — H20.00: ICD-10-CM

## 2023-03-06 DIAGNOSIS — H16.231: ICD-10-CM

## 2023-03-06 DIAGNOSIS — H16.8: ICD-10-CM

## 2023-03-06 DIAGNOSIS — H16.221: ICD-10-CM

## 2023-03-06 PROBLEM — H18.421 BAND KERATOPATHY; RIGHT EYE: Status: ACTIVE | Noted: 2023-02-09

## 2023-03-06 PROCEDURE — 65778 COVER EYE W/MEMBRANE: CPT | Performed by: OPHTHALMOLOGY

## 2023-03-06 PROCEDURE — V2790 AMNIOTIC MEMBRANE: HCPCS | Performed by: OPHTHALMOLOGY

## 2023-03-06 PROCEDURE — 92012 INTRM OPH EXAM EST PATIENT: CPT | Performed by: OPHTHALMOLOGY

## 2023-03-06 PROCEDURE — 92071 CONTACT LENS FITTING FOR TX: CPT | Performed by: OPHTHALMOLOGY

## 2023-03-06 ASSESSMENT — TONOMETRY
OD_IOP_MMHG: 18
OS_IOP_MMHG: 17

## 2023-03-06 ASSESSMENT — SUPERFICIAL PUNCTATE KERATITIS (SPK): OD_SPK: 1+

## 2023-03-06 ASSESSMENT — CONFRONTATIONAL VISUAL FIELD TEST (CVF)
OD_FINDINGS: FULL
OS_FINDINGS: FULL

## 2023-03-13 ENCOUNTER — RX ONLY (RX ONLY)
Age: 43
End: 2023-03-13

## 2023-03-13 ENCOUNTER — OFFICE (OUTPATIENT)
Dept: URBAN - METROPOLITAN AREA CLINIC 115 | Facility: CLINIC | Age: 43
Setting detail: OPHTHALMOLOGY
End: 2023-03-13
Payer: COMMERCIAL

## 2023-03-13 DIAGNOSIS — H16.231: ICD-10-CM

## 2023-03-13 DIAGNOSIS — H20.00: ICD-10-CM

## 2023-03-13 DIAGNOSIS — H16.221: ICD-10-CM

## 2023-03-13 DIAGNOSIS — H16.8: ICD-10-CM

## 2023-03-13 PROCEDURE — 92012 INTRM OPH EXAM EST PATIENT: CPT | Performed by: OPHTHALMOLOGY

## 2023-03-13 ASSESSMENT — KERATOMETRY
OS_AXISANGLE_DEGREES: 090
OS_K2POWER_DIOPTERS: 46.25
OD_K1POWER_DIOPTERS: 45.25
OD_K2POWER_DIOPTERS: 50.00
OD_AXISANGLE_DEGREES: 077
OS_K1POWER_DIOPTERS: 46.25

## 2023-03-13 ASSESSMENT — TONOMETRY
OS_IOP_MMHG: 15
OD_IOP_MMHG: 13

## 2023-03-13 ASSESSMENT — REFRACTION_AUTOREFRACTION
OD_SPHERE: UTP
OS_SPHERE: +0.25

## 2023-03-13 ASSESSMENT — SUPERFICIAL PUNCTATE KERATITIS (SPK): OD_SPK: T 1+

## 2023-03-13 ASSESSMENT — VISUAL ACUITY
OD_BCVA: 20/20
OS_BCVA: 20/200

## 2023-03-13 ASSESSMENT — CONFRONTATIONAL VISUAL FIELD TEST (CVF)
OD_FINDINGS: FULL
OS_FINDINGS: FULL

## 2023-03-15 ASSESSMENT — KERATOMETRY
OS_K1POWER_DIOPTERS: 46.25
OS_AXISANGLE_DEGREES: 090
OD_K1POWER_DIOPTERS: 45.25
OD_AXISANGLE_DEGREES: 077
OD_K2POWER_DIOPTERS: 50.00
OS_K2POWER_DIOPTERS: 46.25

## 2023-03-15 ASSESSMENT — REFRACTION_AUTOREFRACTION
OS_SPHERE: +0.25
OS_AXIS: 092
OS_CYLINDER: -0.25
OD_SPHERE: UTP

## 2023-03-15 ASSESSMENT — AXIALLENGTH_DERIVED: OS_AL: 22.5786

## 2023-03-15 ASSESSMENT — SPHEQUIV_DERIVED: OS_SPHEQUIV: 0.125

## 2023-03-15 ASSESSMENT — VISUAL ACUITY
OS_BCVA: 20/400
OD_BCVA: 20/20-1

## 2023-03-20 ENCOUNTER — OFFICE (OUTPATIENT)
Dept: URBAN - METROPOLITAN AREA CLINIC 32 | Facility: CLINIC | Age: 43
Setting detail: OPHTHALMOLOGY
End: 2023-03-20
Payer: COMMERCIAL

## 2023-03-20 DIAGNOSIS — H33.022: ICD-10-CM

## 2023-03-20 DIAGNOSIS — H20.00: ICD-10-CM

## 2023-03-20 DIAGNOSIS — H33.023: ICD-10-CM

## 2023-03-20 DIAGNOSIS — H16.231: ICD-10-CM

## 2023-03-20 DIAGNOSIS — H16.8: ICD-10-CM

## 2023-03-20 PROCEDURE — 67110 REPAIR DETACHED RETINA: CPT | Performed by: OPHTHALMOLOGY

## 2023-03-20 PROCEDURE — 76512 OPH US DX B-SCAN: CPT | Performed by: OPHTHALMOLOGY

## 2023-03-20 PROCEDURE — 99214 OFFICE O/P EST MOD 30 MIN: CPT | Performed by: OPHTHALMOLOGY

## 2023-03-20 PROCEDURE — 92134 CPTRZ OPH DX IMG PST SGM RTA: CPT | Performed by: OPHTHALMOLOGY

## 2023-03-20 ASSESSMENT — REFRACTION_AUTOREFRACTION
OD_SPHERE: UTP
OS_SPHERE: +0.25

## 2023-03-20 ASSESSMENT — VISUAL ACUITY
OD_BCVA: 20/20
OS_BCVA: 20/200

## 2023-03-20 ASSESSMENT — KERATOMETRY
OD_K1POWER_DIOPTERS: 45.25
OD_K2POWER_DIOPTERS: 50.00
OD_AXISANGLE_DEGREES: 077
OS_K1POWER_DIOPTERS: 46.25
OS_K2POWER_DIOPTERS: 46.25
OS_AXISANGLE_DEGREES: 090

## 2023-03-20 ASSESSMENT — SUPERFICIAL PUNCTATE KERATITIS (SPK): OD_SPK: T 1+

## 2023-03-20 ASSESSMENT — CONFRONTATIONAL VISUAL FIELD TEST (CVF)
OS_FINDINGS: FULL
OD_FINDINGS: FULL

## 2023-03-21 ENCOUNTER — OFFICE (OUTPATIENT)
Facility: LOCATION | Age: 43
Setting detail: OPHTHALMOLOGY
End: 2023-03-21
Payer: COMMERCIAL

## 2023-03-21 DIAGNOSIS — H33.022: ICD-10-CM

## 2023-03-21 PROCEDURE — 99024 POSTOP FOLLOW-UP VISIT: CPT | Performed by: OPHTHALMOLOGY

## 2023-03-21 ASSESSMENT — KERATOMETRY
OS_K2POWER_DIOPTERS: 46.25
OD_K2POWER_DIOPTERS: 50.00
OS_AXISANGLE_DEGREES: 090
OD_K1POWER_DIOPTERS: 45.25
OS_K1POWER_DIOPTERS: 46.25
OD_AXISANGLE_DEGREES: 077

## 2023-03-21 ASSESSMENT — REFRACTION_AUTOREFRACTION
OD_SPHERE: UTP
OS_SPHERE: +0.25

## 2023-03-21 ASSESSMENT — SUPERFICIAL PUNCTATE KERATITIS (SPK): OD_SPK: T 1+

## 2023-03-21 ASSESSMENT — CONFRONTATIONAL VISUAL FIELD TEST (CVF)
OD_FINDINGS: FULL
OS_FINDINGS: FULL

## 2023-03-21 ASSESSMENT — VISUAL ACUITY
OD_BCVA: 20/20
OS_BCVA: 20/CF

## 2023-03-24 ENCOUNTER — RX ONLY (RX ONLY)
Age: 43
End: 2023-03-24

## 2023-03-24 ENCOUNTER — OFFICE (OUTPATIENT)
Dept: URBAN - METROPOLITAN AREA CLINIC 105 | Facility: CLINIC | Age: 43
Setting detail: OPHTHALMOLOGY
End: 2023-03-24
Payer: COMMERCIAL

## 2023-03-24 DIAGNOSIS — H33.022: ICD-10-CM

## 2023-03-24 DIAGNOSIS — H16.8: ICD-10-CM

## 2023-03-24 DIAGNOSIS — H33.023: ICD-10-CM

## 2023-03-24 PROCEDURE — 67145 PROPH RTA DTCHMNT PC: CPT | Performed by: OPHTHALMOLOGY

## 2023-03-24 PROCEDURE — 92134 CPTRZ OPH DX IMG PST SGM RTA: CPT | Performed by: OPHTHALMOLOGY

## 2023-03-24 ASSESSMENT — KERATOMETRY
OS_K1POWER_DIOPTERS: 46.25
OD_K1POWER_DIOPTERS: 45.25
OD_AXISANGLE_DEGREES: 077
OS_AXISANGLE_DEGREES: 090
OD_K2POWER_DIOPTERS: 50.00
OS_K2POWER_DIOPTERS: 46.25

## 2023-03-24 ASSESSMENT — REFRACTION_AUTOREFRACTION
OD_SPHERE: UTP
OS_SPHERE: +0.25

## 2023-03-24 ASSESSMENT — CONFRONTATIONAL VISUAL FIELD TEST (CVF)
OD_FINDINGS: FULL
OS_FINDINGS: FULL

## 2023-03-24 ASSESSMENT — VISUAL ACUITY
OS_BCVA: 20/700
OD_BCVA: 20/20-2

## 2023-03-24 ASSESSMENT — SUPERFICIAL PUNCTATE KERATITIS (SPK): OD_SPK: T 1+

## 2023-03-25 ENCOUNTER — OFFICE (OUTPATIENT)
Dept: URBAN - METROPOLITAN AREA CLINIC 70 | Facility: CLINIC | Age: 43
Setting detail: OPHTHALMOLOGY
End: 2023-03-25
Payer: COMMERCIAL

## 2023-03-25 DIAGNOSIS — H16.231: ICD-10-CM

## 2023-03-25 PROCEDURE — 92012 INTRM OPH EXAM EST PATIENT: CPT | Performed by: OPHTHALMOLOGY

## 2023-03-25 ASSESSMENT — REFRACTION_AUTOREFRACTION
OD_SPHERE: UTP
OS_AXIS: 088
OS_SPHERE: +0.75
OS_CYLINDER: -0.50

## 2023-03-25 ASSESSMENT — KERATOMETRY
OD_AXISANGLE_DEGREES: 077
OD_K2POWER_DIOPTERS: 50.00
OS_K2POWER_DIOPTERS: 46.25
OD_K1POWER_DIOPTERS: 45.25
OS_K1POWER_DIOPTERS: 46.00
OS_AXISANGLE_DEGREES: 033

## 2023-03-25 ASSESSMENT — CONFRONTATIONAL VISUAL FIELD TEST (CVF)
OD_FINDINGS: FULL
OS_FINDINGS: FULL

## 2023-03-25 ASSESSMENT — VISUAL ACUITY
OS_BCVA: 20/200+2
OD_BCVA: 20/20-2

## 2023-03-25 ASSESSMENT — AXIALLENGTH_DERIVED: OS_AL: 22.4872

## 2023-03-25 ASSESSMENT — SPHEQUIV_DERIVED: OS_SPHEQUIV: 0.5

## 2023-03-25 ASSESSMENT — SUPERFICIAL PUNCTATE KERATITIS (SPK): OD_SPK: T 1+

## 2023-03-27 ENCOUNTER — OFFICE (OUTPATIENT)
Dept: URBAN - METROPOLITAN AREA CLINIC 32 | Facility: CLINIC | Age: 43
Setting detail: OPHTHALMOLOGY
End: 2023-03-27
Payer: COMMERCIAL

## 2023-03-27 DIAGNOSIS — H33.022: ICD-10-CM

## 2023-03-27 PROCEDURE — 99024 POSTOP FOLLOW-UP VISIT: CPT | Performed by: OPHTHALMOLOGY

## 2023-03-27 ASSESSMENT — SUPERFICIAL PUNCTATE KERATITIS (SPK): OD_SPK: T 1+

## 2023-03-27 ASSESSMENT — CONFRONTATIONAL VISUAL FIELD TEST (CVF)
OD_FINDINGS: FULL
OS_FINDINGS: FULL

## 2023-03-28 ASSESSMENT — KERATOMETRY
OS_K2POWER_DIOPTERS: 46.25
OS_AXISANGLE_DEGREES: 033
OD_K2POWER_DIOPTERS: 50.00
OD_AXISANGLE_DEGREES: 077
OS_K1POWER_DIOPTERS: 46.00
OD_K1POWER_DIOPTERS: 45.25

## 2023-03-28 ASSESSMENT — VISUAL ACUITY
OS_BCVA: 20/200-1
OD_BCVA: 20/20

## 2023-03-28 ASSESSMENT — AXIALLENGTH_DERIVED: OS_AL: 22.4872

## 2023-03-28 ASSESSMENT — REFRACTION_AUTOREFRACTION
OS_SPHERE: +0.75
OD_SPHERE: UTP
OS_AXIS: 088
OS_CYLINDER: -0.50

## 2023-03-28 ASSESSMENT — SPHEQUIV_DERIVED: OS_SPHEQUIV: 0.5

## 2023-03-30 ENCOUNTER — OFFICE (OUTPATIENT)
Dept: URBAN - METROPOLITAN AREA CLINIC 32 | Facility: CLINIC | Age: 43
Setting detail: OPHTHALMOLOGY
End: 2023-03-30
Payer: COMMERCIAL

## 2023-03-30 DIAGNOSIS — H33.022: ICD-10-CM

## 2023-03-30 PROCEDURE — 99024 POSTOP FOLLOW-UP VISIT: CPT | Performed by: OPHTHALMOLOGY

## 2023-03-30 ASSESSMENT — CONFRONTATIONAL VISUAL FIELD TEST (CVF)
OS_FINDINGS: FULL
OD_FINDINGS: FULL

## 2023-03-30 ASSESSMENT — SUPERFICIAL PUNCTATE KERATITIS (SPK): OD_SPK: T 1+

## 2023-03-31 ASSESSMENT — VISUAL ACUITY
OD_BCVA: 20/20-
OS_BCVA: 20/150-

## 2023-03-31 ASSESSMENT — REFRACTION_AUTOREFRACTION
OS_AXIS: 088
OS_SPHERE: +0.75
OD_SPHERE: UTP
OS_CYLINDER: -0.50

## 2023-03-31 ASSESSMENT — SPHEQUIV_DERIVED: OS_SPHEQUIV: 0.5

## 2023-04-03 ENCOUNTER — OFFICE (OUTPATIENT)
Dept: URBAN - METROPOLITAN AREA CLINIC 104 | Facility: CLINIC | Age: 43
Setting detail: OPHTHALMOLOGY
End: 2023-04-03
Payer: COMMERCIAL

## 2023-04-03 DIAGNOSIS — H16.231: ICD-10-CM

## 2023-04-03 PROCEDURE — 99213 OFFICE O/P EST LOW 20 MIN: CPT | Performed by: OPHTHALMOLOGY

## 2023-04-03 PROCEDURE — 65778 COVER EYE W/MEMBRANE: CPT | Performed by: OPHTHALMOLOGY

## 2023-04-03 PROCEDURE — 92071 CONTACT LENS FITTING FOR TX: CPT | Performed by: OPHTHALMOLOGY

## 2023-04-03 ASSESSMENT — REFRACTION_AUTOREFRACTION
OS_AXIS: 143
OD_SPHERE: UTP
OS_SPHERE: -0.50
OS_CYLINDER: -0.25

## 2023-04-03 ASSESSMENT — TONOMETRY
OD_IOP_MMHG: 12
OS_IOP_MMHG: 20

## 2023-04-03 ASSESSMENT — AXIALLENGTH_DERIVED: OS_AL: 22.79

## 2023-04-03 ASSESSMENT — VISUAL ACUITY
OD_BCVA: 20/20
OS_BCVA: 20/150-1

## 2023-04-03 ASSESSMENT — CONFRONTATIONAL VISUAL FIELD TEST (CVF)
OS_FINDINGS: FULL
OD_FINDINGS: FULL

## 2023-04-03 ASSESSMENT — KERATOMETRY
OS_AXISANGLE_DEGREES: 072
OS_K1POWER_DIOPTERS: 46.36
OD_AXISANGLE_DEGREES: 077
OD_K2POWER_DIOPTERS: 50.00
OD_K1POWER_DIOPTERS: 45.25
OS_K2POWER_DIOPTERS: 46.49

## 2023-04-03 ASSESSMENT — SUPERFICIAL PUNCTATE KERATITIS (SPK): OD_SPK: T 1+

## 2023-04-03 ASSESSMENT — SPHEQUIV_DERIVED: OS_SPHEQUIV: -0.625

## 2023-04-05 ENCOUNTER — OFFICE (OUTPATIENT)
Dept: URBAN - METROPOLITAN AREA CLINIC 115 | Facility: CLINIC | Age: 43
Setting detail: OPHTHALMOLOGY
End: 2023-04-05
Payer: COMMERCIAL

## 2023-04-05 DIAGNOSIS — H16.231: ICD-10-CM

## 2023-04-05 PROCEDURE — 99024 POSTOP FOLLOW-UP VISIT: CPT | Performed by: OPHTHALMOLOGY

## 2023-04-05 PROCEDURE — 99213 OFFICE O/P EST LOW 20 MIN: CPT | Performed by: OPHTHALMOLOGY

## 2023-04-05 PROCEDURE — 92071 CONTACT LENS FITTING FOR TX: CPT | Performed by: OPHTHALMOLOGY

## 2023-04-05 PROCEDURE — 65778 COVER EYE W/MEMBRANE: CPT | Performed by: OPHTHALMOLOGY

## 2023-04-05 ASSESSMENT — SUPERFICIAL PUNCTATE KERATITIS (SPK): OD_SPK: 2+ 3+

## 2023-04-05 ASSESSMENT — KERATOMETRY
OD_K1POWER_DIOPTERS: 45.25
OS_AXISANGLE_DEGREES: 072
OD_AXISANGLE_DEGREES: 077
OS_K1POWER_DIOPTERS: 46.36
OS_K2POWER_DIOPTERS: 46.49
OD_K2POWER_DIOPTERS: 50.00

## 2023-04-05 ASSESSMENT — REFRACTION_AUTOREFRACTION
OS_CYLINDER: 0.00
OS_AXIS: 000
OD_SPHERE: UTP
OS_SPHERE: 0.00

## 2023-04-05 ASSESSMENT — SPHEQUIV_DERIVED: OS_SPHEQUIV: 0

## 2023-04-05 ASSESSMENT — AXIALLENGTH_DERIVED: OS_AL: 22.56

## 2023-04-05 ASSESSMENT — CONFRONTATIONAL VISUAL FIELD TEST (CVF)
OD_FINDINGS: FULL
OS_FINDINGS: FULL

## 2023-04-05 ASSESSMENT — VISUAL ACUITY
OD_BCVA: 20/20
OS_BCVA: 20/350

## 2023-04-05 ASSESSMENT — TONOMETRY: OS_IOP_MMHG: 20

## 2023-04-10 ENCOUNTER — OFFICE (OUTPATIENT)
Dept: URBAN - METROPOLITAN AREA CLINIC 104 | Facility: CLINIC | Age: 43
Setting detail: OPHTHALMOLOGY
End: 2023-04-10
Payer: COMMERCIAL

## 2023-04-10 DIAGNOSIS — H43.812: ICD-10-CM

## 2023-04-10 DIAGNOSIS — H16.231: ICD-10-CM

## 2023-04-10 DIAGNOSIS — H20.00: ICD-10-CM

## 2023-04-10 DIAGNOSIS — H16.8: ICD-10-CM

## 2023-04-10 DIAGNOSIS — S05.02XA: ICD-10-CM

## 2023-04-10 PROBLEM — H33.311 RETINAL TEAR; RIGHT EYE: Status: ACTIVE | Noted: 2023-03-20

## 2023-04-10 PROCEDURE — 65778 COVER EYE W/MEMBRANE: CPT | Performed by: OPHTHALMOLOGY

## 2023-04-10 PROCEDURE — 92071 CONTACT LENS FITTING FOR TX: CPT | Performed by: OPHTHALMOLOGY

## 2023-04-10 PROCEDURE — 99213 OFFICE O/P EST LOW 20 MIN: CPT | Performed by: OPHTHALMOLOGY

## 2023-04-10 ASSESSMENT — SUPERFICIAL PUNCTATE KERATITIS (SPK): OD_SPK: 2+ 3+

## 2023-04-10 ASSESSMENT — VISUAL ACUITY
OS_BCVA: 20/200
OD_BCVA: 20/20

## 2023-04-10 ASSESSMENT — CONFRONTATIONAL VISUAL FIELD TEST (CVF)
OD_FINDINGS: FULL
OS_FINDINGS: FULL

## 2023-04-10 ASSESSMENT — TONOMETRY
OS_IOP_MMHG: 20
OD_IOP_MMHG: 20

## 2023-04-11 ENCOUNTER — OFFICE (OUTPATIENT)
Dept: URBAN - METROPOLITAN AREA CLINIC 94 | Facility: CLINIC | Age: 43
Setting detail: OPHTHALMOLOGY
End: 2023-04-11
Payer: COMMERCIAL

## 2023-04-11 DIAGNOSIS — H16.231: ICD-10-CM

## 2023-04-11 DIAGNOSIS — H33.022: ICD-10-CM

## 2023-04-11 DIAGNOSIS — H33.021: ICD-10-CM

## 2023-04-11 PROCEDURE — 99024 POSTOP FOLLOW-UP VISIT: CPT | Performed by: OPHTHALMOLOGY

## 2023-04-11 ASSESSMENT — TONOMETRY: OS_IOP_MMHG: 18

## 2023-04-11 ASSESSMENT — VISUAL ACUITY
OS_BCVA: 20/250
OD_BCVA: 20/20

## 2023-04-11 ASSESSMENT — CONFRONTATIONAL VISUAL FIELD TEST (CVF)
OD_FINDINGS: FULL
OS_FINDINGS: FULL

## 2023-04-11 ASSESSMENT — SUPERFICIAL PUNCTATE KERATITIS (SPK): OD_SPK: 2+ 3+

## 2023-04-14 ENCOUNTER — OFFICE (OUTPATIENT)
Dept: URBAN - METROPOLITAN AREA CLINIC 104 | Facility: CLINIC | Age: 43
Setting detail: OPHTHALMOLOGY
End: 2023-04-14
Payer: COMMERCIAL

## 2023-04-14 DIAGNOSIS — H16.041: ICD-10-CM

## 2023-04-14 DIAGNOSIS — H16.221: ICD-10-CM

## 2023-04-14 PROCEDURE — 99213 OFFICE O/P EST LOW 20 MIN: CPT | Performed by: OPHTHALMOLOGY

## 2023-04-14 ASSESSMENT — CONFRONTATIONAL VISUAL FIELD TEST (CVF)
OS_FINDINGS: FULL
OD_FINDINGS: FULL

## 2023-04-14 ASSESSMENT — VISUAL ACUITY
OD_BCVA: 20/20
OS_BCVA: 20/250

## 2023-04-14 ASSESSMENT — SUPERFICIAL PUNCTATE KERATITIS (SPK): OD_SPK: 1+

## 2023-04-17 ENCOUNTER — OFFICE (OUTPATIENT)
Dept: URBAN - METROPOLITAN AREA CLINIC 104 | Facility: CLINIC | Age: 43
Setting detail: OPHTHALMOLOGY
End: 2023-04-17
Payer: COMMERCIAL

## 2023-04-17 ENCOUNTER — RX ONLY (RX ONLY)
Age: 43
End: 2023-04-17

## 2023-04-17 DIAGNOSIS — H16.041: ICD-10-CM

## 2023-04-17 PROCEDURE — 99213 OFFICE O/P EST LOW 20 MIN: CPT | Performed by: OPHTHALMOLOGY

## 2023-04-17 ASSESSMENT — CONFRONTATIONAL VISUAL FIELD TEST (CVF)
OS_FINDINGS: FULL
OD_FINDINGS: FULL

## 2023-04-17 ASSESSMENT — TONOMETRY
OS_IOP_MMHG: 14
OD_IOP_MMHG: 14

## 2023-04-17 ASSESSMENT — SUPERFICIAL PUNCTATE KERATITIS (SPK): OD_SPK: 1+

## 2023-04-18 ENCOUNTER — OFFICE (OUTPATIENT)
Dept: URBAN - METROPOLITAN AREA CLINIC 104 | Facility: CLINIC | Age: 43
Setting detail: OPHTHALMOLOGY
End: 2023-04-18
Payer: COMMERCIAL

## 2023-04-18 DIAGNOSIS — H16.041: ICD-10-CM

## 2023-04-18 PROCEDURE — 99213 OFFICE O/P EST LOW 20 MIN: CPT | Performed by: SPECIALIST

## 2023-04-18 ASSESSMENT — SUPERFICIAL PUNCTATE KERATITIS (SPK): OD_SPK: 1+

## 2023-04-18 ASSESSMENT — VISUAL ACUITY
OS_BCVA: 20/250
OD_BCVA: 20/20
OD_BCVA: 20/20
OS_BCVA: 20/250

## 2023-04-18 ASSESSMENT — CONFRONTATIONAL VISUAL FIELD TEST (CVF)
OS_FINDINGS: FULL
OD_FINDINGS: FULL

## 2023-04-19 ENCOUNTER — OFFICE (OUTPATIENT)
Dept: URBAN - METROPOLITAN AREA CLINIC 88 | Facility: CLINIC | Age: 43
Setting detail: OPHTHALMOLOGY
End: 2023-04-19
Payer: COMMERCIAL

## 2023-04-19 DIAGNOSIS — H33.021: ICD-10-CM

## 2023-04-19 DIAGNOSIS — H33.022: ICD-10-CM

## 2023-04-19 PROBLEM — Z00.00 ENCOUNTER FOR PREVENTIVE HEALTH EXAMINATION: Status: ACTIVE | Noted: 2023-04-19

## 2023-04-19 PROCEDURE — 92134 CPTRZ OPH DX IMG PST SGM RTA: CPT | Performed by: OPHTHALMOLOGY

## 2023-04-19 PROCEDURE — 99024 POSTOP FOLLOW-UP VISIT: CPT | Performed by: OPHTHALMOLOGY

## 2023-04-19 ASSESSMENT — CONFRONTATIONAL VISUAL FIELD TEST (CVF)
OD_FINDINGS: FULL
OS_FINDINGS: FULL

## 2023-04-19 ASSESSMENT — TONOMETRY
OD_IOP_MMHG: 13
OS_IOP_MMHG: 14

## 2023-04-19 ASSESSMENT — VISUAL ACUITY
OS_BCVA: 20/350
OD_BCVA: 20/20

## 2023-04-19 ASSESSMENT — SUPERFICIAL PUNCTATE KERATITIS (SPK): OD_SPK: 1+

## 2023-04-20 ENCOUNTER — OFFICE (OUTPATIENT)
Dept: URBAN - METROPOLITAN AREA CLINIC 104 | Facility: CLINIC | Age: 43
Setting detail: OPHTHALMOLOGY
End: 2023-04-20
Payer: COMMERCIAL

## 2023-04-20 DIAGNOSIS — H16.041: ICD-10-CM

## 2023-04-20 PROCEDURE — 99213 OFFICE O/P EST LOW 20 MIN: CPT | Performed by: SPECIALIST

## 2023-04-20 ASSESSMENT — SUPERFICIAL PUNCTATE KERATITIS (SPK): OD_SPK: 1+

## 2023-04-20 ASSESSMENT — VISUAL ACUITY
OD_BCVA: 20/20
OS_BCVA: 20/400

## 2023-04-21 ENCOUNTER — OFFICE (OUTPATIENT)
Dept: URBAN - METROPOLITAN AREA CLINIC 104 | Facility: CLINIC | Age: 43
Setting detail: OPHTHALMOLOGY
End: 2023-04-21
Payer: COMMERCIAL

## 2023-04-21 DIAGNOSIS — H16.041: ICD-10-CM

## 2023-04-21 PROCEDURE — 99213 OFFICE O/P EST LOW 20 MIN: CPT | Performed by: OPHTHALMOLOGY

## 2023-04-21 ASSESSMENT — CONFRONTATIONAL VISUAL FIELD TEST (CVF)
OS_FINDINGS: FULL
OD_FINDINGS: FULL

## 2023-04-21 ASSESSMENT — VISUAL ACUITY
OD_BCVA: 20/20
OS_BCVA: 20/300

## 2023-04-21 ASSESSMENT — SUPERFICIAL PUNCTATE KERATITIS (SPK): OD_SPK: 1+

## 2023-04-24 ENCOUNTER — OFFICE (OUTPATIENT)
Dept: URBAN - METROPOLITAN AREA CLINIC 104 | Facility: CLINIC | Age: 43
Setting detail: OPHTHALMOLOGY
End: 2023-04-24
Payer: COMMERCIAL

## 2023-04-24 DIAGNOSIS — H16.041: ICD-10-CM

## 2023-04-24 PROCEDURE — 99213 OFFICE O/P EST LOW 20 MIN: CPT | Performed by: OPHTHALMOLOGY

## 2023-04-24 ASSESSMENT — VISUAL ACUITY
OS_BCVA: 20/300
OD_BCVA: 20/20

## 2023-04-24 ASSESSMENT — SUPERFICIAL PUNCTATE KERATITIS (SPK): OD_SPK: 1+

## 2023-04-24 ASSESSMENT — CONFRONTATIONAL VISUAL FIELD TEST (CVF)
OD_FINDINGS: FULL
OS_FINDINGS: FULL

## 2023-04-25 ENCOUNTER — OFFICE (OUTPATIENT)
Dept: URBAN - METROPOLITAN AREA CLINIC 32 | Facility: CLINIC | Age: 43
Setting detail: OPHTHALMOLOGY
End: 2023-04-25
Payer: COMMERCIAL

## 2023-04-25 DIAGNOSIS — H33.021: ICD-10-CM

## 2023-04-25 DIAGNOSIS — H35.411: ICD-10-CM

## 2023-04-25 DIAGNOSIS — H33.311: ICD-10-CM

## 2023-04-25 DIAGNOSIS — H33.022: ICD-10-CM

## 2023-04-25 DIAGNOSIS — H43.812: ICD-10-CM

## 2023-04-25 PROCEDURE — 99024 POSTOP FOLLOW-UP VISIT: CPT | Performed by: OPHTHALMOLOGY

## 2023-04-25 ASSESSMENT — SUPERFICIAL PUNCTATE KERATITIS (SPK): OD_SPK: 1+

## 2023-04-25 ASSESSMENT — VISUAL ACUITY
OS_BCVA: 20/300
OD_BCVA: 20/20

## 2023-04-25 ASSESSMENT — CONFRONTATIONAL VISUAL FIELD TEST (CVF)
OD_FINDINGS: FULL
OS_FINDINGS: FULL

## 2023-04-26 ENCOUNTER — OFFICE (OUTPATIENT)
Dept: URBAN - METROPOLITAN AREA CLINIC 70 | Facility: CLINIC | Age: 43
Setting detail: OPHTHALMOLOGY
End: 2023-04-26
Payer: COMMERCIAL

## 2023-04-26 ENCOUNTER — RX ONLY (RX ONLY)
Age: 43
End: 2023-04-26

## 2023-04-26 DIAGNOSIS — H16.041: ICD-10-CM

## 2023-04-26 PROCEDURE — 92012 INTRM OPH EXAM EST PATIENT: CPT | Performed by: OPHTHALMOLOGY

## 2023-04-26 ASSESSMENT — CONFRONTATIONAL VISUAL FIELD TEST (CVF)
OD_FINDINGS: FULL
OS_FINDINGS: FULL

## 2023-04-26 ASSESSMENT — SUPERFICIAL PUNCTATE KERATITIS (SPK): OD_SPK: 1+

## 2023-04-26 ASSESSMENT — VISUAL ACUITY
OS_BCVA: 20/200
OD_BCVA: 20/20

## 2023-05-01 ENCOUNTER — OFFICE (OUTPATIENT)
Dept: URBAN - METROPOLITAN AREA CLINIC 104 | Facility: CLINIC | Age: 43
Setting detail: OPHTHALMOLOGY
End: 2023-05-01
Payer: COMMERCIAL

## 2023-05-01 DIAGNOSIS — H16.231: ICD-10-CM

## 2023-05-01 DIAGNOSIS — H16.221: ICD-10-CM

## 2023-05-01 DIAGNOSIS — H16.041: ICD-10-CM

## 2023-05-01 PROCEDURE — 99213 OFFICE O/P EST LOW 20 MIN: CPT | Performed by: OPHTHALMOLOGY

## 2023-05-01 ASSESSMENT — VISUAL ACUITY
OD_BCVA: 20/20
OS_BCVA: 20/250

## 2023-05-01 ASSESSMENT — CONFRONTATIONAL VISUAL FIELD TEST (CVF)
OD_FINDINGS: FULL
OS_FINDINGS: FULL

## 2023-05-01 ASSESSMENT — TONOMETRY
OS_IOP_MMHG: 18
OD_IOP_MMHG: 20

## 2023-05-01 ASSESSMENT — SUPERFICIAL PUNCTATE KERATITIS (SPK): OD_SPK: 1+

## 2023-05-03 ENCOUNTER — OFFICE (OUTPATIENT)
Dept: URBAN - METROPOLITAN AREA CLINIC 88 | Facility: CLINIC | Age: 43
Setting detail: OPHTHALMOLOGY
End: 2023-05-03
Payer: COMMERCIAL

## 2023-05-03 DIAGNOSIS — H33.022: ICD-10-CM

## 2023-05-03 DIAGNOSIS — H43.812: ICD-10-CM

## 2023-05-03 DIAGNOSIS — H35.411: ICD-10-CM

## 2023-05-03 DIAGNOSIS — H33.021: ICD-10-CM

## 2023-05-03 DIAGNOSIS — H33.311: ICD-10-CM

## 2023-05-03 PROBLEM — H16.041 CORNEAL ULCER; RIGHT EYE: Status: ACTIVE | Noted: 2023-04-14

## 2023-05-03 PROCEDURE — 99024 POSTOP FOLLOW-UP VISIT: CPT | Performed by: OPHTHALMOLOGY

## 2023-05-03 PROCEDURE — 92134 CPTRZ OPH DX IMG PST SGM RTA: CPT | Performed by: OPHTHALMOLOGY

## 2023-05-03 ASSESSMENT — VISUAL ACUITY
OS_BCVA: 20/200
OD_BCVA: 20/20

## 2023-05-03 ASSESSMENT — TONOMETRY: OS_IOP_MMHG: 12

## 2023-05-03 ASSESSMENT — CONFRONTATIONAL VISUAL FIELD TEST (CVF)
OS_FINDINGS: FULL
OD_FINDINGS: FULL

## 2023-05-03 ASSESSMENT — SUPERFICIAL PUNCTATE KERATITIS (SPK): OD_SPK: 1+

## 2023-05-09 ENCOUNTER — OFFICE (OUTPATIENT)
Dept: URBAN - METROPOLITAN AREA CLINIC 32 | Facility: CLINIC | Age: 43
Setting detail: OPHTHALMOLOGY
End: 2023-05-09
Payer: COMMERCIAL

## 2023-05-09 DIAGNOSIS — H33.021: ICD-10-CM

## 2023-05-09 PROCEDURE — 99024 POSTOP FOLLOW-UP VISIT: CPT | Performed by: OPHTHALMOLOGY

## 2023-05-09 ASSESSMENT — VISUAL ACUITY
OD_BCVA: 20/20-1
OS_BCVA: 20/200

## 2023-05-09 ASSESSMENT — SUPERFICIAL PUNCTATE KERATITIS (SPK): OD_SPK: 1+

## 2023-05-12 ENCOUNTER — Encounter (OUTPATIENT)
Dept: URBAN - METROPOLITAN AREA CLINIC 104 | Facility: CLINIC | Age: 43
Setting detail: OPHTHALMOLOGY
End: 2023-05-12
Payer: COMMERCIAL

## 2023-05-15 ENCOUNTER — OFFICE (OUTPATIENT)
Dept: URBAN - METROPOLITAN AREA CLINIC 104 | Facility: CLINIC | Age: 43
Setting detail: OPHTHALMOLOGY
End: 2023-05-15
Payer: COMMERCIAL

## 2023-05-15 DIAGNOSIS — H16.231: ICD-10-CM

## 2023-05-15 DIAGNOSIS — H16.041: ICD-10-CM

## 2023-05-15 DIAGNOSIS — H20.00: ICD-10-CM

## 2023-05-15 DIAGNOSIS — Z96.1: ICD-10-CM

## 2023-05-15 DIAGNOSIS — H16.221: ICD-10-CM

## 2023-05-15 DIAGNOSIS — H18.421: ICD-10-CM

## 2023-05-15 DIAGNOSIS — H26.491: ICD-10-CM

## 2023-05-15 DIAGNOSIS — H16.8: ICD-10-CM

## 2023-05-15 DIAGNOSIS — H52.7: ICD-10-CM

## 2023-05-15 PROCEDURE — 99213 OFFICE O/P EST LOW 20 MIN: CPT | Performed by: OPHTHALMOLOGY

## 2023-05-15 ASSESSMENT — TONOMETRY
OS_IOP_MMHG: 19
OD_IOP_MMHG: 19

## 2023-05-15 ASSESSMENT — SUPERFICIAL PUNCTATE KERATITIS (SPK): OD_SPK: 1+

## 2023-05-15 ASSESSMENT — CONFRONTATIONAL VISUAL FIELD TEST (CVF)
OS_FINDINGS: FULL
OD_FINDINGS: FULL

## 2023-05-15 ASSESSMENT — VISUAL ACUITY
OS_BCVA: 20/200
OD_BCVA: 20/20

## 2023-05-22 ENCOUNTER — Encounter (OUTPATIENT)
Dept: URBAN - METROPOLITAN AREA CLINIC 104 | Facility: CLINIC | Age: 43
Setting detail: OPHTHALMOLOGY
End: 2023-05-22
Payer: COMMERCIAL

## 2023-05-23 ENCOUNTER — OFFICE (OUTPATIENT)
Dept: URBAN - METROPOLITAN AREA CLINIC 32 | Facility: CLINIC | Age: 43
Setting detail: OPHTHALMOLOGY
End: 2023-05-23
Payer: COMMERCIAL

## 2023-05-23 DIAGNOSIS — H33.021: ICD-10-CM

## 2023-05-23 DIAGNOSIS — H33.022: ICD-10-CM

## 2023-05-23 PROCEDURE — 92134 CPTRZ OPH DX IMG PST SGM RTA: CPT | Performed by: OPHTHALMOLOGY

## 2023-05-23 PROCEDURE — 99024 POSTOP FOLLOW-UP VISIT: CPT | Performed by: OPHTHALMOLOGY

## 2023-05-23 ASSESSMENT — SUPERFICIAL PUNCTATE KERATITIS (SPK): OD_SPK: 1+

## 2023-05-30 ASSESSMENT — VISUAL ACUITY
OD_BCVA: 20/20
OS_BCVA: 20/400

## 2023-06-05 ENCOUNTER — OFFICE (OUTPATIENT)
Dept: URBAN - METROPOLITAN AREA CLINIC 104 | Facility: CLINIC | Age: 43
Setting detail: OPHTHALMOLOGY
End: 2023-06-05
Payer: COMMERCIAL

## 2023-06-05 ENCOUNTER — RX ONLY (RX ONLY)
Age: 43
End: 2023-06-05

## 2023-06-05 DIAGNOSIS — H16.221: ICD-10-CM

## 2023-06-05 DIAGNOSIS — H26.491: ICD-10-CM

## 2023-06-05 DIAGNOSIS — H18.421: ICD-10-CM

## 2023-06-05 DIAGNOSIS — Z96.1: ICD-10-CM

## 2023-06-05 DIAGNOSIS — H16.231: ICD-10-CM

## 2023-06-05 DIAGNOSIS — H16.041: ICD-10-CM

## 2023-06-05 DIAGNOSIS — H16.8: ICD-10-CM

## 2023-06-05 PROCEDURE — 99213 OFFICE O/P EST LOW 20 MIN: CPT | Performed by: OPHTHALMOLOGY

## 2023-06-05 ASSESSMENT — CONFRONTATIONAL VISUAL FIELD TEST (CVF)
OS_FINDINGS: FULL
OD_FINDINGS: FULL

## 2023-06-05 ASSESSMENT — SUPERFICIAL PUNCTATE KERATITIS (SPK): OD_SPK: 1+

## 2023-06-05 ASSESSMENT — VISUAL ACUITY
OD_BCVA: 20/20
OS_BCVA: 20/300

## 2023-06-05 ASSESSMENT — TONOMETRY
OS_IOP_MMHG: 12
OD_IOP_MMHG: 12

## 2023-06-07 ENCOUNTER — OFFICE (OUTPATIENT)
Dept: URBAN - METROPOLITAN AREA CLINIC 88 | Facility: CLINIC | Age: 43
Setting detail: OPHTHALMOLOGY
End: 2023-06-07
Payer: COMMERCIAL

## 2023-06-07 DIAGNOSIS — H43.812: ICD-10-CM

## 2023-06-07 DIAGNOSIS — H33.022: ICD-10-CM

## 2023-06-07 DIAGNOSIS — H33.021: ICD-10-CM

## 2023-06-07 DIAGNOSIS — H35.411: ICD-10-CM

## 2023-06-07 DIAGNOSIS — H33.311: ICD-10-CM

## 2023-06-07 PROCEDURE — 92134 CPTRZ OPH DX IMG PST SGM RTA: CPT | Performed by: OPHTHALMOLOGY

## 2023-06-07 PROCEDURE — 99024 POSTOP FOLLOW-UP VISIT: CPT | Performed by: OPHTHALMOLOGY

## 2023-06-07 ASSESSMENT — SUPERFICIAL PUNCTATE KERATITIS (SPK): OD_SPK: 1+

## 2023-06-07 ASSESSMENT — TONOMETRY
OS_IOP_MMHG: 12
OD_IOP_MMHG: 15

## 2023-06-07 ASSESSMENT — CONFRONTATIONAL VISUAL FIELD TEST (CVF)
OS_FINDINGS: FULL
OD_FINDINGS: FULL

## 2023-06-08 ASSESSMENT — VISUAL ACUITY
OS_BCVA: 20/250
OD_BCVA: 20/20-

## 2023-06-23 ENCOUNTER — OFFICE (OUTPATIENT)
Dept: URBAN - METROPOLITAN AREA CLINIC 104 | Facility: CLINIC | Age: 43
Setting detail: OPHTHALMOLOGY
End: 2023-06-23
Payer: COMMERCIAL

## 2023-06-23 ENCOUNTER — RX ONLY (RX ONLY)
Age: 43
End: 2023-06-23

## 2023-06-23 DIAGNOSIS — H16.041: ICD-10-CM

## 2023-06-23 PROCEDURE — 99213 OFFICE O/P EST LOW 20 MIN: CPT | Performed by: OPHTHALMOLOGY

## 2023-06-23 ASSESSMENT — SUPERFICIAL PUNCTATE KERATITIS (SPK): OD_SPK: 1+ 2+

## 2023-06-23 ASSESSMENT — CORNEAL SURGICAL SCARRING: OD_SCARRING: MID PERIPHERAL STROMAL

## 2023-06-23 ASSESSMENT — CONFRONTATIONAL VISUAL FIELD TEST (CVF)
OS_FINDINGS: FULL
OD_FINDINGS: FULL

## 2023-06-23 ASSESSMENT — TONOMETRY
OS_IOP_MMHG: 10
OD_IOP_MMHG: 10

## 2023-06-23 ASSESSMENT — VISUAL ACUITY
OD_BCVA: 20/20-
OS_BCVA: 20/300

## 2023-07-03 ENCOUNTER — OFFICE (OUTPATIENT)
Dept: URBAN - METROPOLITAN AREA CLINIC 104 | Facility: CLINIC | Age: 43
Setting detail: OPHTHALMOLOGY
End: 2023-07-03
Payer: COMMERCIAL

## 2023-07-03 DIAGNOSIS — H16.041: ICD-10-CM

## 2023-07-03 PROCEDURE — 99213 OFFICE O/P EST LOW 20 MIN: CPT | Performed by: OPHTHALMOLOGY

## 2023-07-03 ASSESSMENT — KERATOMETRY
OS_AXISANGLE_DEGREES: 089
OS_K1POWER_DIOPTERS: 46.17
OD_K1POWER_DIOPTERS: 44.94
OD_K2POWER_DIOPTERS: 50.45
OS_K2POWER_DIOPTERS: 46.42
OD_AXISANGLE_DEGREES: 157

## 2023-07-03 ASSESSMENT — CORNEAL SURGICAL SCARRING: OD_SCARRING: MID PERIPHERAL STROMAL

## 2023-07-03 ASSESSMENT — CONFRONTATIONAL VISUAL FIELD TEST (CVF)
OD_FINDINGS: FULL
OS_FINDINGS: FULL

## 2023-07-03 ASSESSMENT — SUPERFICIAL PUNCTATE KERATITIS (SPK): OD_SPK: 1+ 2+

## 2023-07-03 ASSESSMENT — REFRACTION_AUTOREFRACTION
OD_SPHERE: PLANO
OD_CYLINDER: -1.75
OS_AXIS: 126
OD_AXIS: 059
OS_CYLINDER: -0.25
OS_SPHERE: +0.50

## 2023-07-03 ASSESSMENT — SPHEQUIV_DERIVED: OS_SPHEQUIV: 0.375

## 2023-07-03 ASSESSMENT — VISUAL ACUITY
OS_BCVA: 20/200
OD_BCVA: 20/20-1

## 2023-07-03 ASSESSMENT — AXIALLENGTH_DERIVED: OS_AL: 22.47

## 2023-07-06 ENCOUNTER — OFFICE (OUTPATIENT)
Dept: URBAN - METROPOLITAN AREA CLINIC 32 | Facility: CLINIC | Age: 43
Setting detail: OPHTHALMOLOGY
End: 2023-07-06
Payer: COMMERCIAL

## 2023-07-06 DIAGNOSIS — H33.021: ICD-10-CM

## 2023-07-06 DIAGNOSIS — H33.311: ICD-10-CM

## 2023-07-06 DIAGNOSIS — H33.022: ICD-10-CM

## 2023-07-06 DIAGNOSIS — H35.411: ICD-10-CM

## 2023-07-06 DIAGNOSIS — H43.812: ICD-10-CM

## 2023-07-06 PROCEDURE — 92134 CPTRZ OPH DX IMG PST SGM RTA: CPT | Performed by: OPHTHALMOLOGY

## 2023-07-06 PROCEDURE — 99213 OFFICE O/P EST LOW 20 MIN: CPT | Performed by: OPHTHALMOLOGY

## 2023-07-06 ASSESSMENT — CONFRONTATIONAL VISUAL FIELD TEST (CVF)
OD_FINDINGS: FULL
OS_FINDINGS: FULL

## 2023-07-06 ASSESSMENT — KERATOMETRY
OD_AXISANGLE_DEGREES: 157
OS_K1POWER_DIOPTERS: 46.17
OD_K2POWER_DIOPTERS: 50.45
OD_K1POWER_DIOPTERS: 44.94
OS_K2POWER_DIOPTERS: 46.42
OS_AXISANGLE_DEGREES: 089

## 2023-07-06 ASSESSMENT — VISUAL ACUITY
OS_BCVA: 20/300
OD_BCVA: 20/20

## 2023-07-06 ASSESSMENT — CORNEAL SURGICAL SCARRING: OD_SCARRING: MID PERIPHERAL STROMAL

## 2023-07-06 ASSESSMENT — REFRACTION_AUTOREFRACTION
OS_AXIS: 126
OD_CYLINDER: -1.75
OS_CYLINDER: -0.25
OD_SPHERE: PLANO
OS_SPHERE: +0.50
OD_AXIS: 059

## 2023-07-06 ASSESSMENT — SPHEQUIV_DERIVED: OS_SPHEQUIV: 0.375

## 2023-07-06 ASSESSMENT — AXIALLENGTH_DERIVED: OS_AL: 22.47

## 2023-07-06 ASSESSMENT — SUPERFICIAL PUNCTATE KERATITIS (SPK): OD_SPK: 1+ 2+

## 2023-07-31 ENCOUNTER — OFFICE (OUTPATIENT)
Dept: URBAN - METROPOLITAN AREA CLINIC 104 | Facility: CLINIC | Age: 43
Setting detail: OPHTHALMOLOGY
End: 2023-07-31
Payer: COMMERCIAL

## 2023-07-31 DIAGNOSIS — H16.231: ICD-10-CM

## 2023-07-31 DIAGNOSIS — H16.041: ICD-10-CM

## 2023-07-31 DIAGNOSIS — H16.8: ICD-10-CM

## 2023-07-31 DIAGNOSIS — H18.421: ICD-10-CM

## 2023-07-31 DIAGNOSIS — H16.221: ICD-10-CM

## 2023-07-31 PROCEDURE — 99213 OFFICE O/P EST LOW 20 MIN: CPT | Performed by: OPHTHALMOLOGY

## 2023-07-31 ASSESSMENT — CONFRONTATIONAL VISUAL FIELD TEST (CVF)
OS_FINDINGS: FULL
OD_FINDINGS: FULL

## 2023-07-31 ASSESSMENT — REFRACTION_AUTOREFRACTION
OD_SPHERE: UNABLE
OS_CYLINDER: -0.50
OS_SPHERE: +0.50
OS_AXIS: 103

## 2023-07-31 ASSESSMENT — TONOMETRY: OS_IOP_MMHG: 20

## 2023-07-31 ASSESSMENT — CORNEAL SURGICAL SCARRING: OD_SCARRING: MID PERIPHERAL STROMAL

## 2023-07-31 ASSESSMENT — VISUAL ACUITY
OD_BCVA: 20/300
OS_BCVA: 20/20

## 2023-07-31 ASSESSMENT — SPHEQUIV_DERIVED: OS_SPHEQUIV: 0.25

## 2023-07-31 ASSESSMENT — SUPERFICIAL PUNCTATE KERATITIS (SPK): OD_SPK: 1+ 2+

## 2023-08-02 ENCOUNTER — OFFICE (OUTPATIENT)
Dept: URBAN - METROPOLITAN AREA CLINIC 88 | Facility: CLINIC | Age: 43
Setting detail: OPHTHALMOLOGY
End: 2023-08-02
Payer: COMMERCIAL

## 2023-08-02 DIAGNOSIS — H35.411: ICD-10-CM

## 2023-08-02 DIAGNOSIS — H33.311: ICD-10-CM

## 2023-08-02 DIAGNOSIS — H33.022: ICD-10-CM

## 2023-08-02 DIAGNOSIS — H33.021: ICD-10-CM

## 2023-08-02 DIAGNOSIS — H43.812: ICD-10-CM

## 2023-08-02 PROCEDURE — 92134 CPTRZ OPH DX IMG PST SGM RTA: CPT | Performed by: OPHTHALMOLOGY

## 2023-08-02 PROCEDURE — 99213 OFFICE O/P EST LOW 20 MIN: CPT | Performed by: OPHTHALMOLOGY

## 2023-08-02 ASSESSMENT — REFRACTION_AUTOREFRACTION
OD_SPHERE: UNABLE
OS_CYLINDER: -0.50
OS_AXIS: 103
OS_SPHERE: +0.50

## 2023-08-02 ASSESSMENT — SUPERFICIAL PUNCTATE KERATITIS (SPK): OD_SPK: 1+ 2+

## 2023-08-02 ASSESSMENT — VISUAL ACUITY
OD_BCVA: 20/25
OS_BCVA: 20/300

## 2023-08-02 ASSESSMENT — CONFRONTATIONAL VISUAL FIELD TEST (CVF)
OS_FINDINGS: FULL
OD_FINDINGS: FULL

## 2023-08-02 ASSESSMENT — CORNEAL SURGICAL SCARRING: OD_SCARRING: MID PERIPHERAL STROMAL

## 2023-08-02 ASSESSMENT — KERATOMETRY
OD_K1POWER_DIOPTERS: UNABLE
OS_AXISANGLE_DEGREES: 007
OS_K2POWER_DIOPTERS: 46.17
OS_K1POWER_DIOPTERS: 46.04

## 2023-08-02 ASSESSMENT — TONOMETRY
OS_IOP_MMHG: 17
OD_IOP_MMHG: 21

## 2023-08-02 ASSESSMENT — SPHEQUIV_DERIVED: OS_SPHEQUIV: 0.25

## 2023-08-02 ASSESSMENT — AXIALLENGTH_DERIVED: OS_AL: 22.58

## 2023-08-14 ENCOUNTER — OFFICE (OUTPATIENT)
Dept: URBAN - METROPOLITAN AREA CLINIC 104 | Facility: CLINIC | Age: 43
Setting detail: OPHTHALMOLOGY
End: 2023-08-14
Payer: COMMERCIAL

## 2023-08-14 DIAGNOSIS — H16.231: ICD-10-CM

## 2023-08-14 DIAGNOSIS — H26.491: ICD-10-CM

## 2023-08-14 DIAGNOSIS — H18.421: ICD-10-CM

## 2023-08-14 DIAGNOSIS — H16.041: ICD-10-CM

## 2023-08-14 DIAGNOSIS — H16.8: ICD-10-CM

## 2023-08-14 DIAGNOSIS — Z96.1: ICD-10-CM

## 2023-08-14 DIAGNOSIS — H16.221: ICD-10-CM

## 2023-08-14 PROCEDURE — 99213 OFFICE O/P EST LOW 20 MIN: CPT | Performed by: OPHTHALMOLOGY

## 2023-08-14 ASSESSMENT — AXIALLENGTH_DERIVED: OS_AL: 22.6157

## 2023-08-14 ASSESSMENT — CONFRONTATIONAL VISUAL FIELD TEST (CVF)
OD_FINDINGS: FULL
OS_FINDINGS: FULL

## 2023-08-14 ASSESSMENT — VISUAL ACUITY
OD_BCVA: 20/25
OS_BCVA: 20/300

## 2023-08-14 ASSESSMENT — REFRACTION_AUTOREFRACTION
OS_AXIS: 100
OD_CYLINDER: -4.00
OD_AXIS: 129
OS_CYLINDER: -0.75
OD_SPHERE: +0.25
OS_SPHERE: +0.50

## 2023-08-14 ASSESSMENT — KERATOMETRY
OS_K1POWER_DIOPTERS: 45.98
OD_K1POWER_DIOPTERS: UNABLE
OS_AXISANGLE_DEGREES: 096
OS_K2POWER_DIOPTERS: 46.30

## 2023-08-14 ASSESSMENT — SUPERFICIAL PUNCTATE KERATITIS (SPK): OD_SPK: 1+ 2+

## 2023-08-14 ASSESSMENT — SPHEQUIV_DERIVED
OS_SPHEQUIV: 0.125
OD_SPHEQUIV: -1.75

## 2023-08-14 ASSESSMENT — TONOMETRY
OD_IOP_MMHG: 17
OS_IOP_MMHG: 10

## 2023-08-14 ASSESSMENT — CORNEAL SURGICAL SCARRING: OD_SCARRING: MID PERIPHERAL STROMAL

## 2023-08-31 ENCOUNTER — OFFICE (OUTPATIENT)
Dept: URBAN - METROPOLITAN AREA CLINIC 32 | Facility: CLINIC | Age: 43
Setting detail: OPHTHALMOLOGY
End: 2023-08-31
Payer: COMMERCIAL

## 2023-08-31 DIAGNOSIS — H33.022: ICD-10-CM

## 2023-08-31 DIAGNOSIS — H33.311: ICD-10-CM

## 2023-08-31 DIAGNOSIS — H20.00: ICD-10-CM

## 2023-08-31 DIAGNOSIS — H43.812: ICD-10-CM

## 2023-08-31 DIAGNOSIS — H35.411: ICD-10-CM

## 2023-08-31 DIAGNOSIS — H33.021: ICD-10-CM

## 2023-08-31 PROCEDURE — 92134 CPTRZ OPH DX IMG PST SGM RTA: CPT | Performed by: OPHTHALMOLOGY

## 2023-08-31 PROCEDURE — 92012 INTRM OPH EXAM EST PATIENT: CPT | Performed by: OPHTHALMOLOGY

## 2023-08-31 ASSESSMENT — VISUAL ACUITY
OD_BCVA: 20/20
OS_BCVA: 20/200

## 2023-08-31 ASSESSMENT — SPHEQUIV_DERIVED
OS_SPHEQUIV: 0.125
OD_SPHEQUIV: -1.75

## 2023-08-31 ASSESSMENT — REFRACTION_AUTOREFRACTION
OD_SPHERE: +0.25
OD_CYLINDER: -4.00
OS_SPHERE: +0.50
OD_AXIS: 129
OS_AXIS: 100
OS_CYLINDER: -0.75

## 2023-08-31 ASSESSMENT — CONFRONTATIONAL VISUAL FIELD TEST (CVF)
OS_FINDINGS: FULL
OD_FINDINGS: FULL

## 2023-08-31 ASSESSMENT — CORNEAL SURGICAL SCARRING: OD_SCARRING: MID PERIPHERAL STROMAL

## 2023-08-31 ASSESSMENT — SUPERFICIAL PUNCTATE KERATITIS (SPK): OD_SPK: 1+ 2+

## 2023-09-18 ENCOUNTER — RX ONLY (RX ONLY)
Age: 43
End: 2023-09-18

## 2023-09-18 ENCOUNTER — OFFICE (OUTPATIENT)
Dept: URBAN - METROPOLITAN AREA CLINIC 104 | Facility: CLINIC | Age: 43
Setting detail: OPHTHALMOLOGY
End: 2023-09-18
Payer: COMMERCIAL

## 2023-09-18 DIAGNOSIS — Z96.1: ICD-10-CM

## 2023-09-18 DIAGNOSIS — H16.221: ICD-10-CM

## 2023-09-18 DIAGNOSIS — H26.491: ICD-10-CM

## 2023-09-18 DIAGNOSIS — H18.421: ICD-10-CM

## 2023-09-18 DIAGNOSIS — H16.231: ICD-10-CM

## 2023-09-18 DIAGNOSIS — H16.8: ICD-10-CM

## 2023-09-18 DIAGNOSIS — H17.9: ICD-10-CM

## 2023-09-18 PROCEDURE — 99213 OFFICE O/P EST LOW 20 MIN: CPT | Performed by: OPHTHALMOLOGY

## 2023-09-18 ASSESSMENT — VISUAL ACUITY
OS_BCVA: 20/100
OD_BCVA: 20/20

## 2023-09-18 ASSESSMENT — AXIALLENGTH_DERIVED: OS_AL: 22.57

## 2023-09-18 ASSESSMENT — KERATOMETRY
OD_K1POWER_DIOPTERS: 44.82
OS_K1POWER_DIOPTERS: 46.04
OD_K2POWER_DIOPTERS: 50.45
OD_AXISANGLE_DEGREES: 170
OS_AXISANGLE_DEGREES: 097
OS_K2POWER_DIOPTERS: 46.23

## 2023-09-18 ASSESSMENT — REFRACTION_AUTOREFRACTION
OD_CYLINDER: -2.00
OS_CYLINDER: -0.50
OD_SPHERE: PLANO
OS_AXIS: 111
OS_SPHERE: +0.50
OD_AXIS: 085

## 2023-09-18 ASSESSMENT — TONOMETRY
OS_IOP_MMHG: 15
OD_IOP_MMHG: 15

## 2023-09-18 ASSESSMENT — CONFRONTATIONAL VISUAL FIELD TEST (CVF)
OD_FINDINGS: FULL
OS_FINDINGS: FULL

## 2023-09-18 ASSESSMENT — SPHEQUIV_DERIVED: OS_SPHEQUIV: 0.25

## 2023-09-18 ASSESSMENT — SUPERFICIAL PUNCTATE KERATITIS (SPK): OD_SPK: 1+ 2+

## 2023-09-18 ASSESSMENT — CORNEAL SURGICAL SCARRING: OD_SCARRING: MID PERIPHERAL STROMAL

## 2023-09-26 ENCOUNTER — OFFICE (OUTPATIENT)
Dept: URBAN - METROPOLITAN AREA CLINIC 32 | Facility: CLINIC | Age: 43
Setting detail: OPHTHALMOLOGY
End: 2023-09-26
Payer: COMMERCIAL

## 2023-09-26 DIAGNOSIS — H35.411: ICD-10-CM

## 2023-09-26 DIAGNOSIS — H33.311: ICD-10-CM

## 2023-09-26 DIAGNOSIS — H43.812: ICD-10-CM

## 2023-09-26 DIAGNOSIS — H33.022: ICD-10-CM

## 2023-09-26 DIAGNOSIS — H33.021: ICD-10-CM

## 2023-09-26 PROBLEM — H17.9 CORNEAL SCAR ; RIGHT EYE: Status: ACTIVE | Noted: 2023-09-18

## 2023-09-26 PROCEDURE — 92012 INTRM OPH EXAM EST PATIENT: CPT | Performed by: OPHTHALMOLOGY

## 2023-09-26 PROCEDURE — 92134 CPTRZ OPH DX IMG PST SGM RTA: CPT | Performed by: OPHTHALMOLOGY

## 2023-09-26 ASSESSMENT — CONFRONTATIONAL VISUAL FIELD TEST (CVF)
OD_FINDINGS: FULL
OS_FINDINGS: FULL

## 2023-09-26 ASSESSMENT — SUPERFICIAL PUNCTATE KERATITIS (SPK): OD_SPK: 1+ 2+

## 2023-09-26 ASSESSMENT — CORNEAL SURGICAL SCARRING: OD_SCARRING: MID PERIPHERAL STROMAL

## 2023-09-28 PROBLEM — H33.021 RETINAL DETACHMENT WITH MULTIPLE BREAKS; RIGHT EYE: Status: ACTIVE | Noted: 2023-09-26

## 2023-09-28 ASSESSMENT — KERATOMETRY
OS_K2POWER_DIOPTERS: 46.23
OD_K2POWER_DIOPTERS: 50.45
OS_AXISANGLE_DEGREES: 097
OD_AXISANGLE_DEGREES: 170
OS_K1POWER_DIOPTERS: 46.04
OD_K1POWER_DIOPTERS: 44.82

## 2023-09-28 ASSESSMENT — REFRACTION_AUTOREFRACTION
OD_AXIS: 085
OS_SPHERE: +0.50
OD_SPHERE: PLANO
OD_CYLINDER: -2.00
OS_CYLINDER: -0.50
OS_AXIS: 111

## 2023-09-28 ASSESSMENT — VISUAL ACUITY
OD_BCVA: 20/20-1
OS_BCVA: 20/200

## 2023-09-28 ASSESSMENT — SPHEQUIV_DERIVED: OS_SPHEQUIV: 0.25

## 2023-09-28 ASSESSMENT — AXIALLENGTH_DERIVED: OS_AL: 22.57

## 2023-10-24 ENCOUNTER — OFFICE (OUTPATIENT)
Dept: URBAN - METROPOLITAN AREA CLINIC 32 | Facility: CLINIC | Age: 43
Setting detail: OPHTHALMOLOGY
End: 2023-10-24
Payer: COMMERCIAL

## 2023-10-24 DIAGNOSIS — H20.00: ICD-10-CM

## 2023-10-24 DIAGNOSIS — H35.411: ICD-10-CM

## 2023-10-24 DIAGNOSIS — H33.022: ICD-10-CM

## 2023-10-24 DIAGNOSIS — H33.311: ICD-10-CM

## 2023-10-24 DIAGNOSIS — H33.021: ICD-10-CM

## 2023-10-24 DIAGNOSIS — H43.812: ICD-10-CM

## 2023-10-24 PROCEDURE — 92134 CPTRZ OPH DX IMG PST SGM RTA: CPT | Performed by: OPHTHALMOLOGY

## 2023-10-24 PROCEDURE — 92012 INTRM OPH EXAM EST PATIENT: CPT | Performed by: OPHTHALMOLOGY

## 2023-10-24 ASSESSMENT — KERATOMETRY
OD_K2POWER_DIOPTERS: 50.45
OS_K1POWER_DIOPTERS: 46.04
OD_K1POWER_DIOPTERS: 44.82
OD_AXISANGLE_DEGREES: 170
OS_AXISANGLE_DEGREES: 097
OS_K2POWER_DIOPTERS: 46.23

## 2023-10-24 ASSESSMENT — AXIALLENGTH_DERIVED: OS_AL: 22.57

## 2023-10-24 ASSESSMENT — VISUAL ACUITY
OS_BCVA: 20/400
OD_BCVA: 20/20-1

## 2023-10-24 ASSESSMENT — REFRACTION_AUTOREFRACTION
OS_CYLINDER: -0.50
OS_SPHERE: +0.50
OD_AXIS: 085
OD_CYLINDER: -2.00
OS_AXIS: 111
OD_SPHERE: PLANO

## 2023-10-24 ASSESSMENT — CORNEAL SURGICAL SCARRING: OD_SCARRING: MID PERIPHERAL STROMAL

## 2023-10-24 ASSESSMENT — SPHEQUIV_DERIVED: OS_SPHEQUIV: 0.25

## 2023-10-24 ASSESSMENT — SUPERFICIAL PUNCTATE KERATITIS (SPK): OD_SPK: 1+ 2+

## 2023-10-30 ENCOUNTER — OFFICE (OUTPATIENT)
Dept: URBAN - METROPOLITAN AREA CLINIC 104 | Facility: CLINIC | Age: 43
Setting detail: OPHTHALMOLOGY
End: 2023-10-30
Payer: COMMERCIAL

## 2023-10-30 DIAGNOSIS — H17.9: ICD-10-CM

## 2023-10-30 DIAGNOSIS — H33.022: ICD-10-CM

## 2023-10-30 DIAGNOSIS — H20.00: ICD-10-CM

## 2023-10-30 DIAGNOSIS — H16.221: ICD-10-CM

## 2023-10-30 PROBLEM — H33.021 RETINAL DETACHMENT WITH MULTIPLE BREAKS; RIGHT EYE: Status: ACTIVE | Noted: 2023-10-30

## 2023-10-30 PROCEDURE — 99213 OFFICE O/P EST LOW 20 MIN: CPT | Performed by: OPHTHALMOLOGY

## 2023-10-30 ASSESSMENT — VISUAL ACUITY
OS_BCVA: 20/300
OD_BCVA: 20/20-

## 2023-10-30 ASSESSMENT — CORNEAL SURGICAL SCARRING: OD_SCARRING: MID PERIPHERAL STROMAL

## 2023-10-30 ASSESSMENT — CONFRONTATIONAL VISUAL FIELD TEST (CVF)
OS_FINDINGS: FULL
OD_FINDINGS: FULL

## 2023-10-30 ASSESSMENT — REFRACTION_AUTOREFRACTION
OD_SPHERE: -0.25
OS_SPHERE: +0.50
OD_CYLINDER: -3.25
OS_AXIS: 068
OD_AXIS: 101
OS_CYLINDER: -0.25

## 2023-10-30 ASSESSMENT — SPHEQUIV_DERIVED
OD_SPHEQUIV: -1.875
OS_SPHEQUIV: 0.375

## 2023-10-30 ASSESSMENT — SUPERFICIAL PUNCTATE KERATITIS (SPK): OD_SPK: 1+ 2+

## 2023-11-28 ENCOUNTER — OFFICE (OUTPATIENT)
Dept: URBAN - METROPOLITAN AREA CLINIC 32 | Facility: CLINIC | Age: 43
Setting detail: OPHTHALMOLOGY
End: 2023-11-28
Payer: COMMERCIAL

## 2023-11-28 DIAGNOSIS — H33.021: ICD-10-CM

## 2023-11-28 DIAGNOSIS — H35.411: ICD-10-CM

## 2023-11-28 DIAGNOSIS — H20.00: ICD-10-CM

## 2023-11-28 DIAGNOSIS — H33.022: ICD-10-CM

## 2023-11-28 DIAGNOSIS — H43.812: ICD-10-CM

## 2023-11-28 PROCEDURE — 92134 CPTRZ OPH DX IMG PST SGM RTA: CPT | Performed by: OPHTHALMOLOGY

## 2023-11-28 PROCEDURE — 92012 INTRM OPH EXAM EST PATIENT: CPT | Performed by: OPHTHALMOLOGY

## 2023-11-28 ASSESSMENT — REFRACTION_AUTOREFRACTION
OD_AXIS: 101
OD_SPHERE: -0.25
OS_CYLINDER: -0.25
OS_AXIS: 068
OS_SPHERE: +0.50
OD_CYLINDER: -3.25

## 2023-11-28 ASSESSMENT — SUPERFICIAL PUNCTATE KERATITIS (SPK): OD_SPK: 1+ 2+

## 2023-11-28 ASSESSMENT — SPHEQUIV_DERIVED
OD_SPHEQUIV: -1.875
OS_SPHEQUIV: 0.375

## 2023-11-28 ASSESSMENT — CORNEAL SURGICAL SCARRING: OD_SCARRING: MID PERIPHERAL STROMAL

## 2023-12-04 ENCOUNTER — RX ONLY (RX ONLY)
Age: 43
End: 2023-12-04

## 2023-12-04 ENCOUNTER — OFFICE (OUTPATIENT)
Dept: URBAN - METROPOLITAN AREA CLINIC 104 | Facility: CLINIC | Age: 43
Setting detail: OPHTHALMOLOGY
End: 2023-12-04
Payer: COMMERCIAL

## 2023-12-04 DIAGNOSIS — H16.221: ICD-10-CM

## 2023-12-04 DIAGNOSIS — H17.9: ICD-10-CM

## 2023-12-04 DIAGNOSIS — H16.231: ICD-10-CM

## 2023-12-04 DIAGNOSIS — H16.8: ICD-10-CM

## 2023-12-04 PROBLEM — H33.022 RETINAL DETACHMENT WITH MULTIPLE BREAKS; LEFT EYE: Status: ACTIVE | Noted: 2023-12-04

## 2023-12-04 PROCEDURE — 83861 MICROFLUID ANALY TEARS: CPT | Mod: QW | Performed by: OPHTHALMOLOGY

## 2023-12-04 PROCEDURE — 99213 OFFICE O/P EST LOW 20 MIN: CPT | Performed by: OPHTHALMOLOGY

## 2023-12-04 ASSESSMENT — REFRACTION_AUTOREFRACTION
OD_AXIS: 095
OS_SPHERE: +0.50
OD_SPHERE: PLANO
OD_CYLINDER: -4.00

## 2023-12-04 ASSESSMENT — CORNEAL SURGICAL SCARRING: OD_SCARRING: MID PERIPHERAL STROMAL

## 2023-12-04 ASSESSMENT — SUPERFICIAL PUNCTATE KERATITIS (SPK): OD_SPK: 1+ 2+

## 2023-12-04 ASSESSMENT — CONFRONTATIONAL VISUAL FIELD TEST (CVF)
OS_FINDINGS: FULL
OD_FINDINGS: FULL

## 2024-01-04 ENCOUNTER — OFFICE (OUTPATIENT)
Dept: URBAN - METROPOLITAN AREA CLINIC 32 | Facility: CLINIC | Age: 44
Setting detail: OPHTHALMOLOGY
End: 2024-01-04
Payer: COMMERCIAL

## 2024-01-04 DIAGNOSIS — H43.812: ICD-10-CM

## 2024-01-04 DIAGNOSIS — H20.00: ICD-10-CM

## 2024-01-04 DIAGNOSIS — H33.021: ICD-10-CM

## 2024-01-04 DIAGNOSIS — H35.411: ICD-10-CM

## 2024-01-04 DIAGNOSIS — H33.022: ICD-10-CM

## 2024-01-04 PROCEDURE — 99213 OFFICE O/P EST LOW 20 MIN: CPT | Performed by: OPHTHALMOLOGY

## 2024-01-04 PROCEDURE — 92134 CPTRZ OPH DX IMG PST SGM RTA: CPT | Performed by: OPHTHALMOLOGY

## 2024-01-04 ASSESSMENT — SUPERFICIAL PUNCTATE KERATITIS (SPK): OD_SPK: 1+ 2+

## 2024-01-04 ASSESSMENT — REFRACTION_AUTOREFRACTION
OD_CYLINDER: -4.00
OD_SPHERE: PLANO
OD_AXIS: 095
OS_SPHERE: +0.50

## 2024-01-04 ASSESSMENT — CONFRONTATIONAL VISUAL FIELD TEST (CVF)
OD_FINDINGS: FULL
OS_FINDINGS: FULL

## 2024-01-04 ASSESSMENT — CORNEAL SURGICAL SCARRING: OD_SCARRING: MID PERIPHERAL STROMAL

## 2024-02-22 ENCOUNTER — OFFICE (OUTPATIENT)
Dept: URBAN - METROPOLITAN AREA CLINIC 32 | Facility: CLINIC | Age: 44
Setting detail: OPHTHALMOLOGY
End: 2024-02-22
Payer: COMMERCIAL

## 2024-02-22 DIAGNOSIS — H43.812: ICD-10-CM

## 2024-02-22 DIAGNOSIS — H20.00: ICD-10-CM

## 2024-02-22 DIAGNOSIS — H33.022: ICD-10-CM

## 2024-02-22 DIAGNOSIS — H35.411: ICD-10-CM

## 2024-02-22 DIAGNOSIS — H33.021: ICD-10-CM

## 2024-02-22 PROCEDURE — 92134 CPTRZ OPH DX IMG PST SGM RTA: CPT | Performed by: OPHTHALMOLOGY

## 2024-02-22 PROCEDURE — 92012 INTRM OPH EXAM EST PATIENT: CPT | Performed by: OPHTHALMOLOGY

## 2024-02-22 ASSESSMENT — CONFRONTATIONAL VISUAL FIELD TEST (CVF)
OS_FINDINGS: FULL
OD_FINDINGS: FULL

## 2024-02-22 ASSESSMENT — REFRACTION_AUTOREFRACTION
OS_SPHERE: +0.50
OD_CYLINDER: -4.00
OD_AXIS: 095
OD_SPHERE: PLANO

## 2024-02-22 ASSESSMENT — CORNEAL SURGICAL SCARRING: OD_SCARRING: MID PERIPHERAL STROMAL

## 2024-02-22 ASSESSMENT — SUPERFICIAL PUNCTATE KERATITIS (SPK): OD_SPK: 1+ 2+

## 2024-02-28 ENCOUNTER — OFFICE (OUTPATIENT)
Dept: URBAN - METROPOLITAN AREA CLINIC 115 | Facility: CLINIC | Age: 44
Setting detail: OPHTHALMOLOGY
End: 2024-02-28
Payer: COMMERCIAL

## 2024-02-28 DIAGNOSIS — H33.021: ICD-10-CM

## 2024-02-28 DIAGNOSIS — H35.411: ICD-10-CM

## 2024-02-28 DIAGNOSIS — H40.041: ICD-10-CM

## 2024-02-28 DIAGNOSIS — H33.022: ICD-10-CM

## 2024-02-28 DIAGNOSIS — H43.812: ICD-10-CM

## 2024-02-28 DIAGNOSIS — H20.00: ICD-10-CM

## 2024-02-28 DIAGNOSIS — H53.433: ICD-10-CM

## 2024-02-28 DIAGNOSIS — H46.2: ICD-10-CM

## 2024-02-28 PROCEDURE — 92202 OPSCPY EXTND ON/MAC DRAW: CPT | Performed by: OPHTHALMOLOGY

## 2024-02-28 PROCEDURE — 92014 COMPRE OPH EXAM EST PT 1/>: CPT | Performed by: OPHTHALMOLOGY

## 2024-02-28 PROCEDURE — 92133 CPTRZD OPH DX IMG PST SGM ON: CPT | Performed by: OPHTHALMOLOGY

## 2024-02-28 PROCEDURE — 92020 GONIOSCOPY: CPT | Performed by: OPHTHALMOLOGY

## 2024-02-28 PROCEDURE — 92083 EXTENDED VISUAL FIELD XM: CPT | Performed by: OPHTHALMOLOGY

## 2024-02-28 ASSESSMENT — REFRACTION_AUTOREFRACTION
OD_CYLINDER: -0.25
OD_SPHERE: +0.25
OS_SPHERE: UTP
OD_AXIS: 079

## 2024-02-28 ASSESSMENT — CONFRONTATIONAL VISUAL FIELD TEST (CVF)
OD_FINDINGS: FULL
OS_FINDINGS: FULL

## 2024-02-28 ASSESSMENT — SUPERFICIAL PUNCTATE KERATITIS (SPK): OD_SPK: 1+ 2+

## 2024-02-28 ASSESSMENT — CORNEAL SURGICAL SCARRING: OD_SCARRING: MID PERIPHERAL STROMAL

## 2024-02-28 ASSESSMENT — SPHEQUIV_DERIVED: OD_SPHEQUIV: 0.125

## 2024-03-11 ENCOUNTER — OFFICE (OUTPATIENT)
Dept: URBAN - METROPOLITAN AREA CLINIC 104 | Facility: CLINIC | Age: 44
Setting detail: OPHTHALMOLOGY
End: 2024-03-11
Payer: COMMERCIAL

## 2024-03-11 DIAGNOSIS — H53.433: ICD-10-CM

## 2024-03-11 DIAGNOSIS — H26.491: ICD-10-CM

## 2024-03-11 DIAGNOSIS — Z96.1: ICD-10-CM

## 2024-03-11 DIAGNOSIS — H35.411: ICD-10-CM

## 2024-03-11 DIAGNOSIS — H17.9: ICD-10-CM

## 2024-03-11 DIAGNOSIS — H43.812: ICD-10-CM

## 2024-03-11 DIAGNOSIS — H18.421: ICD-10-CM

## 2024-03-11 DIAGNOSIS — H16.8: ICD-10-CM

## 2024-03-11 DIAGNOSIS — H40.041: ICD-10-CM

## 2024-03-11 DIAGNOSIS — H16.221: ICD-10-CM

## 2024-03-11 PROBLEM — H33.022 RETINAL DETACHMENT WITH MULTIPLE BREAKS; LEFT EYE: Status: ACTIVE | Noted: 2024-03-11

## 2024-03-11 PROCEDURE — 99213 OFFICE O/P EST LOW 20 MIN: CPT | Performed by: OPHTHALMOLOGY

## 2024-04-04 ENCOUNTER — OFFICE (OUTPATIENT)
Dept: URBAN - METROPOLITAN AREA CLINIC 32 | Facility: CLINIC | Age: 44
Setting detail: OPHTHALMOLOGY
End: 2024-04-04
Payer: COMMERCIAL

## 2024-04-04 DIAGNOSIS — H33.022: ICD-10-CM

## 2024-04-04 DIAGNOSIS — H43.812: ICD-10-CM

## 2024-04-04 DIAGNOSIS — H35.411: ICD-10-CM

## 2024-04-04 DIAGNOSIS — H20.00: ICD-10-CM

## 2024-04-04 DIAGNOSIS — H33.021: ICD-10-CM

## 2024-04-04 PROCEDURE — 99213 OFFICE O/P EST LOW 20 MIN: CPT | Performed by: OPHTHALMOLOGY

## 2024-04-04 PROCEDURE — 92201 OPSCPY EXTND RTA DRAW UNI/BI: CPT | Performed by: OPHTHALMOLOGY

## 2024-04-04 PROCEDURE — 92134 CPTRZ OPH DX IMG PST SGM RTA: CPT | Performed by: OPHTHALMOLOGY

## 2024-04-15 ENCOUNTER — OFFICE (OUTPATIENT)
Dept: URBAN - METROPOLITAN AREA CLINIC 104 | Facility: CLINIC | Age: 44
Setting detail: OPHTHALMOLOGY
End: 2024-04-15
Payer: COMMERCIAL

## 2024-04-15 DIAGNOSIS — H16.8: ICD-10-CM

## 2024-04-15 DIAGNOSIS — Z96.1: ICD-10-CM

## 2024-04-15 DIAGNOSIS — H26.491: ICD-10-CM

## 2024-04-15 DIAGNOSIS — H40.041: ICD-10-CM

## 2024-04-15 DIAGNOSIS — H16.221: ICD-10-CM

## 2024-04-15 DIAGNOSIS — H17.9: ICD-10-CM

## 2024-04-15 DIAGNOSIS — H18.421: ICD-10-CM

## 2024-04-15 PROCEDURE — 99213 OFFICE O/P EST LOW 20 MIN: CPT | Performed by: OPHTHALMOLOGY

## 2024-04-25 PROBLEM — H33.022 RETINAL DETACHMENT WITH MULTIPLE BREAKS; LEFT EYE: Status: ACTIVE | Noted: 2024-04-04

## 2024-05-09 ENCOUNTER — OFFICE (OUTPATIENT)
Dept: URBAN - METROPOLITAN AREA CLINIC 32 | Facility: CLINIC | Age: 44
Setting detail: OPHTHALMOLOGY
End: 2024-05-09
Payer: COMMERCIAL

## 2024-05-09 ENCOUNTER — RX ONLY (RX ONLY)
Age: 44
End: 2024-05-09

## 2024-05-09 DIAGNOSIS — H33.311: ICD-10-CM

## 2024-05-09 DIAGNOSIS — H43.812: ICD-10-CM

## 2024-05-09 DIAGNOSIS — H35.411: ICD-10-CM

## 2024-05-09 DIAGNOSIS — H33.021: ICD-10-CM

## 2024-05-09 DIAGNOSIS — H20.00: ICD-10-CM

## 2024-05-09 DIAGNOSIS — H33.022: ICD-10-CM

## 2024-05-09 DIAGNOSIS — H53.433: ICD-10-CM

## 2024-05-09 PROCEDURE — 92134 CPTRZ OPH DX IMG PST SGM RTA: CPT | Performed by: OPHTHALMOLOGY

## 2024-05-09 PROCEDURE — 99213 OFFICE O/P EST LOW 20 MIN: CPT | Performed by: OPHTHALMOLOGY

## 2024-05-15 ENCOUNTER — OFFICE (OUTPATIENT)
Dept: URBAN - METROPOLITAN AREA CLINIC 116 | Facility: CLINIC | Age: 44
Setting detail: OPHTHALMOLOGY
End: 2024-05-15
Payer: COMMERCIAL

## 2024-05-15 DIAGNOSIS — H17.9: ICD-10-CM

## 2024-05-15 PROBLEM — H33.022 RETINAL DETACHMENT WITH MULTIPLE BREAKS; LEFT EYE: Status: ACTIVE | Noted: 2024-05-15

## 2024-05-15 PROCEDURE — 99213 OFFICE O/P EST LOW 20 MIN: CPT | Performed by: OPTOMETRIST

## 2024-05-15 ASSESSMENT — CONFRONTATIONAL VISUAL FIELD TEST (CVF)
OD_FINDINGS: FULL
OS_FINDINGS: FULL

## 2024-06-17 ENCOUNTER — OFFICE (OUTPATIENT)
Dept: URBAN - METROPOLITAN AREA CLINIC 32 | Facility: CLINIC | Age: 44
Setting detail: OPHTHALMOLOGY
End: 2024-06-17
Payer: COMMERCIAL

## 2024-06-17 DIAGNOSIS — H20.00: ICD-10-CM

## 2024-06-17 PROBLEM — H33.022 RETINAL DETACHMENT WITH MULTIPLE BREAKS; LEFT EYE: Status: ACTIVE | Noted: 2024-06-17

## 2024-06-17 PROCEDURE — 92134 CPTRZ OPH DX IMG PST SGM RTA: CPT | Performed by: OPHTHALMOLOGY

## 2024-06-17 PROCEDURE — 92012 INTRM OPH EXAM EST PATIENT: CPT | Performed by: OPHTHALMOLOGY

## 2024-06-25 ENCOUNTER — APPOINTMENT (OUTPATIENT)
Dept: INTERNAL MEDICINE | Facility: CLINIC | Age: 44
End: 2024-06-25
Payer: COMMERCIAL

## 2024-06-25 ENCOUNTER — NON-APPOINTMENT (OUTPATIENT)
Age: 44
End: 2024-06-25

## 2024-06-25 VITALS
HEIGHT: 68 IN | HEART RATE: 63 BPM | WEIGHT: 148 LBS | SYSTOLIC BLOOD PRESSURE: 100 MMHG | BODY MASS INDEX: 22.43 KG/M2 | DIASTOLIC BLOOD PRESSURE: 58 MMHG

## 2024-06-25 DIAGNOSIS — E55.9 VITAMIN D DEFICIENCY, UNSPECIFIED: ICD-10-CM

## 2024-06-25 DIAGNOSIS — R79.9 ABNORMAL FINDING OF BLOOD CHEMISTRY, UNSPECIFIED: ICD-10-CM

## 2024-06-25 DIAGNOSIS — Z78.9 OTHER SPECIFIED HEALTH STATUS: ICD-10-CM

## 2024-06-25 DIAGNOSIS — N40.0 BENIGN PROSTATIC HYPERPLASIA WITHOUT LOWER URINARY TRACT SYMPMS: ICD-10-CM

## 2024-06-25 DIAGNOSIS — Z00.00 ENCOUNTER FOR GENERAL ADULT MEDICAL EXAMINATION W/OUT ABNORMAL FINDINGS: ICD-10-CM

## 2024-06-25 DIAGNOSIS — Z86.79 PERSONAL HISTORY OF OTHER DISEASES OF THE CIRCULATORY SYSTEM: ICD-10-CM

## 2024-06-25 DIAGNOSIS — R53.83 OTHER FATIGUE: ICD-10-CM

## 2024-06-25 PROCEDURE — 99396 PREV VISIT EST AGE 40-64: CPT

## 2024-06-25 PROCEDURE — 36415 COLL VENOUS BLD VENIPUNCTURE: CPT

## 2024-06-25 PROCEDURE — 93000 ELECTROCARDIOGRAM COMPLETE: CPT

## 2024-06-27 LAB
25(OH)D3 SERPL-MCNC: 26.4 NG/ML
ALBUMIN SERPL ELPH-MCNC: 4.5 G/DL
ALP BLD-CCNC: 79 U/L
ALT SERPL-CCNC: 14 U/L
ANION GAP SERPL CALC-SCNC: 13 MMOL/L
AST SERPL-CCNC: 20 U/L
BASOPHILS # BLD AUTO: 0.05 K/UL
BASOPHILS NFR BLD AUTO: 0.9 %
BILIRUB SERPL-MCNC: 0.5 MG/DL
BUN SERPL-MCNC: 17 MG/DL
CALCIUM SERPL-MCNC: 9.8 MG/DL
CHLORIDE SERPL-SCNC: 103 MMOL/L
CHOLEST SERPL-MCNC: 186 MG/DL
CO2 SERPL-SCNC: 24 MMOL/L
CREAT SERPL-MCNC: 0.65 MG/DL
EGFR: 121 ML/MIN/1.73M2
EOSINOPHIL # BLD AUTO: 0.17 K/UL
EOSINOPHIL NFR BLD AUTO: 3.1 %
ESTIMATED AVERAGE GLUCOSE: 103 MG/DL
FOLATE SERPL-MCNC: 17.4 NG/ML
GLUCOSE SERPL-MCNC: 96 MG/DL
HBA1C MFR BLD HPLC: 5.2 %
HCT VFR BLD CALC: 39.4 %
HDLC SERPL-MCNC: 66 MG/DL
HGB BLD-MCNC: 13.6 G/DL
IMM GRANULOCYTES NFR BLD AUTO: 0.2 %
IRON SATN MFR SERPL: 36 %
IRON SERPL-MCNC: 119 UG/DL
LDLC SERPL CALC-MCNC: 109 MG/DL
LYMPHOCYTES # BLD AUTO: 1.91 K/UL
LYMPHOCYTES NFR BLD AUTO: 35.2 %
MAN DIFF?: NORMAL
MCHC RBC-ENTMCNC: 31.9 PG
MCHC RBC-ENTMCNC: 34.5 GM/DL
MCV RBC AUTO: 92.3 FL
MONOCYTES # BLD AUTO: 0.53 K/UL
MONOCYTES NFR BLD AUTO: 9.8 %
NEUTROPHILS # BLD AUTO: 2.75 K/UL
NEUTROPHILS NFR BLD AUTO: 50.8 %
NONHDLC SERPL-MCNC: 120 MG/DL
PLATELET # BLD AUTO: 299 K/UL
POTASSIUM SERPL-SCNC: 4.5 MMOL/L
PROT SERPL-MCNC: 7.6 G/DL
PSA SERPL-MCNC: 0.43 NG/ML
RBC # BLD: 4.27 M/UL
RBC # FLD: 12 %
SODIUM SERPL-SCNC: 141 MMOL/L
TIBC SERPL-MCNC: 335 UG/DL
TRIGL SERPL-MCNC: 60 MG/DL
TSH SERPL-ACNC: 1.61 UIU/ML
UIBC SERPL-MCNC: 215 UG/DL
VIT B12 SERPL-MCNC: 751 PG/ML
WBC # FLD AUTO: 5.42 K/UL

## 2024-07-08 ENCOUNTER — OFFICE (OUTPATIENT)
Dept: URBAN - METROPOLITAN AREA CLINIC 104 | Facility: CLINIC | Age: 44
Setting detail: OPHTHALMOLOGY
End: 2024-07-08
Payer: COMMERCIAL

## 2024-07-08 DIAGNOSIS — H16.8: ICD-10-CM

## 2024-07-08 DIAGNOSIS — H16.221: ICD-10-CM

## 2024-07-08 DIAGNOSIS — H17.9: ICD-10-CM

## 2024-07-08 DIAGNOSIS — H18.421: ICD-10-CM

## 2024-07-08 DIAGNOSIS — H40.041: ICD-10-CM

## 2024-07-08 PROBLEM — H33.022 RETINAL DETACHMENT WITH MULTIPLE BREAKS; LEFT EYE: Status: ACTIVE | Noted: 2024-07-08

## 2024-07-08 PROCEDURE — 99213 OFFICE O/P EST LOW 20 MIN: CPT | Performed by: OPHTHALMOLOGY

## 2024-07-08 ASSESSMENT — CONFRONTATIONAL VISUAL FIELD TEST (CVF)
OD_FINDINGS: FULL
OS_FINDINGS: FULL

## 2024-08-15 ENCOUNTER — DOCTOR'S OFFICE (OUTPATIENT)
Age: 44
Setting detail: OPHTHALMOLOGY
End: 2024-08-15
Payer: COMMERCIAL

## 2024-08-15 DIAGNOSIS — H20.00: ICD-10-CM

## 2024-08-15 PROCEDURE — 92014 COMPRE OPH EXAM EST PT 1/>: CPT | Performed by: OPHTHALMOLOGY

## 2024-08-15 PROCEDURE — 92134 CPTRZ OPH DX IMG PST SGM RTA: CPT | Performed by: OPHTHALMOLOGY

## 2024-08-15 ASSESSMENT — CONFRONTATIONAL VISUAL FIELD TEST (CVF)
OS_FINDINGS: FULL
OD_FINDINGS: FULL

## 2024-08-21 PROBLEM — H33.022 RETINAL DETACHMENT WITH MULTIPLE BREAKS; LEFT EYE: Status: ACTIVE | Noted: 2024-08-15

## 2024-10-03 ENCOUNTER — DOCTOR'S OFFICE (OUTPATIENT)
Facility: LOCATION | Age: 44
Setting detail: OPHTHALMOLOGY
End: 2024-10-03
Payer: COMMERCIAL

## 2024-10-03 DIAGNOSIS — H20.00: ICD-10-CM

## 2024-10-03 DIAGNOSIS — H35.371: ICD-10-CM

## 2024-10-03 PROBLEM — H33.022 RETINAL DETACHMENT WITH MULTIPLE BREAKS; LEFT EYE: Status: ACTIVE | Noted: 2024-10-03

## 2024-10-03 PROCEDURE — 92012 INTRM OPH EXAM EST PATIENT: CPT | Performed by: OPHTHALMOLOGY

## 2024-10-03 PROCEDURE — 92134 CPTRZ OPH DX IMG PST SGM RTA: CPT | Performed by: OPHTHALMOLOGY

## 2024-10-03 ASSESSMENT — CONFRONTATIONAL VISUAL FIELD TEST (CVF)
OS_FINDINGS: FULL
OD_FINDINGS: FULL

## 2024-10-03 ASSESSMENT — REFRACTION_AUTOREFRACTION
OS_CYLINDER: -0.25
OS_AXIS: 104
OD_SPHERE: -0.50
OD_AXIS: 074
OS_SPHERE: +0.25
OD_CYLINDER: -2.50

## 2024-10-03 ASSESSMENT — CORNEAL SURGICAL SCARRING: OD_SCARRING: MID PERIPHERAL STROMAL

## 2024-10-03 ASSESSMENT — KERATOMETRY
OS_K1POWER_DIOPTERS: 46.23
OD_K2POWER_DIOPTERS: 48.01
OD_AXISANGLE_DEGREES: 070
OS_AXISANGLE_DEGREES: 098
OS_K2POWER_DIOPTERS: 46.42
OD_K1POWER_DIOPTERS: 33.55

## 2024-10-03 ASSESSMENT — VISUAL ACUITY
OD_BCVA: 20/25
OS_BCVA: 20/70

## 2024-10-03 ASSESSMENT — SUPERFICIAL PUNCTATE KERATITIS (SPK): OD_SPK: 1+ 2+

## 2024-11-11 ENCOUNTER — RX ONLY (RX ONLY)
Age: 44
End: 2024-11-11

## 2024-11-11 ENCOUNTER — DOCTOR'S OFFICE (OUTPATIENT)
Facility: LOCATION | Age: 44
Setting detail: OPHTHALMOLOGY
End: 2024-11-11
Payer: COMMERCIAL

## 2024-11-11 DIAGNOSIS — H20.00: ICD-10-CM

## 2024-11-11 DIAGNOSIS — H35.371: ICD-10-CM

## 2024-11-11 PROCEDURE — 92012 INTRM OPH EXAM EST PATIENT: CPT | Performed by: OPHTHALMOLOGY

## 2024-11-11 PROCEDURE — 92134 CPTRZ OPH DX IMG PST SGM RTA: CPT | Performed by: OPHTHALMOLOGY

## 2024-11-11 ASSESSMENT — REFRACTION_AUTOREFRACTION
OS_AXIS: 104
OD_CYLINDER: -2.50
OD_SPHERE: -0.50
OD_AXIS: 074
OS_SPHERE: +0.25
OS_CYLINDER: -0.25

## 2024-11-11 ASSESSMENT — VISUAL ACUITY
OD_BCVA: 20/20
OS_BCVA: 20/150

## 2024-11-11 ASSESSMENT — KERATOMETRY
OS_K1POWER_DIOPTERS: 46.23
OS_K2POWER_DIOPTERS: 46.42
OD_K2POWER_DIOPTERS: 48.01
OD_AXISANGLE_DEGREES: 070
OS_AXISANGLE_DEGREES: 098
OD_K1POWER_DIOPTERS: 33.55

## 2024-11-11 ASSESSMENT — SUPERFICIAL PUNCTATE KERATITIS (SPK): OD_SPK: 1+ 2+

## 2024-11-11 ASSESSMENT — CORNEAL SURGICAL SCARRING: OD_SCARRING: MID PERIPHERAL STROMAL

## 2024-11-18 ENCOUNTER — OFFICE (OUTPATIENT)
Dept: URBAN - METROPOLITAN AREA CLINIC 104 | Facility: CLINIC | Age: 44
Setting detail: OPHTHALMOLOGY
End: 2024-11-18
Payer: COMMERCIAL

## 2024-11-18 DIAGNOSIS — H40.041: ICD-10-CM

## 2024-11-18 DIAGNOSIS — H16.223: ICD-10-CM

## 2024-11-18 DIAGNOSIS — H17.9: ICD-10-CM

## 2024-11-18 PROBLEM — H33.022 RETINAL DETACHMENT WITH MULTIPLE BREAKS; LEFT EYE: Status: ACTIVE | Noted: 2024-11-18

## 2024-11-18 PROBLEM — H16.221 DRY EYE SYNDROME K SICCA; RIGHT EYE, LEFT EYE, BOTH EYES: Status: ACTIVE | Noted: 2024-11-18

## 2024-11-18 PROBLEM — H16.222 DRY EYE SYNDROME K SICCA; RIGHT EYE, LEFT EYE, BOTH EYES: Status: ACTIVE | Noted: 2024-11-18

## 2024-11-18 PROCEDURE — 92250 FUNDUS PHOTOGRAPHY W/I&R: CPT | Performed by: OPHTHALMOLOGY

## 2024-11-18 PROCEDURE — 99213 OFFICE O/P EST LOW 20 MIN: CPT | Mod: 25 | Performed by: OPHTHALMOLOGY

## 2024-11-18 PROCEDURE — 68761 CLOSE TEAR DUCT OPENING: CPT | Mod: 50 | Performed by: OPHTHALMOLOGY

## 2024-11-18 ASSESSMENT — REFRACTION_AUTOREFRACTION
OS_CYLINDER: -0.25
OD_CYLINDER: -2.50
OD_SPHERE: -0.50
OS_AXIS: 104
OD_AXIS: 074
OS_SPHERE: +0.25

## 2024-11-18 ASSESSMENT — CONFRONTATIONAL VISUAL FIELD TEST (CVF)
OD_FINDINGS: FULL
OS_FINDINGS: FULL

## 2024-11-18 ASSESSMENT — KERATOMETRY
OS_K2POWER_DIOPTERS: 46.42
OS_AXISANGLE_DEGREES: 098
OD_K2POWER_DIOPTERS: 48.01
OD_AXISANGLE_DEGREES: 070
OD_K1POWER_DIOPTERS: 33.55
OS_K1POWER_DIOPTERS: 46.23

## 2024-11-18 ASSESSMENT — SUPERFICIAL PUNCTATE KERATITIS (SPK)
OD_SPK: 1+ 2+
OS_SPK: 1+ 2+

## 2024-11-18 ASSESSMENT — VISUAL ACUITY
OS_BCVA: 20/250
OD_BCVA: 20/20-2

## 2024-11-18 ASSESSMENT — CORNEAL SURGICAL SCARRING: OD_SCARRING: MID PERIPHERAL STROMAL

## 2024-12-09 ENCOUNTER — OFFICE (OUTPATIENT)
Dept: URBAN - METROPOLITAN AREA CLINIC 104 | Facility: CLINIC | Age: 44
Setting detail: OPHTHALMOLOGY
End: 2024-12-09
Payer: COMMERCIAL

## 2024-12-09 DIAGNOSIS — H40.041: ICD-10-CM

## 2024-12-09 DIAGNOSIS — H17.9: ICD-10-CM

## 2024-12-09 DIAGNOSIS — H16.222: ICD-10-CM

## 2024-12-09 DIAGNOSIS — H16.223: ICD-10-CM

## 2024-12-09 DIAGNOSIS — H16.221: ICD-10-CM

## 2024-12-09 PROCEDURE — 99213 OFFICE O/P EST LOW 20 MIN: CPT | Performed by: OPHTHALMOLOGY

## 2024-12-09 ASSESSMENT — REFRACTION_AUTOREFRACTION
OS_CYLINDER: -0.25
OS_AXIS: 104
OD_AXIS: 074
OS_SPHERE: +0.25
OD_SPHERE: -0.50
OD_CYLINDER: -2.50

## 2024-12-09 ASSESSMENT — VISUAL ACUITY
OD_BCVA: 20/20-
OS_BCVA: CF 3 FT

## 2024-12-09 ASSESSMENT — KERATOMETRY
OD_AXISANGLE_DEGREES: 070
OS_AXISANGLE_DEGREES: 098
OS_K1POWER_DIOPTERS: 46.23
OD_K1POWER_DIOPTERS: 33.55
OS_K2POWER_DIOPTERS: 46.42
OD_K2POWER_DIOPTERS: 48.01

## 2024-12-09 ASSESSMENT — SUPERFICIAL PUNCTATE KERATITIS (SPK)
OS_SPK: 1+ 2+
OD_SPK: 1+ 2+

## 2024-12-09 ASSESSMENT — CONFRONTATIONAL VISUAL FIELD TEST (CVF)
OS_FINDINGS: FULL
OD_FINDINGS: FULL

## 2024-12-09 ASSESSMENT — CORNEAL SURGICAL SCARRING: OD_SCARRING: MID PERIPHERAL STROMAL

## 2024-12-19 ENCOUNTER — DOCTOR'S OFFICE (OUTPATIENT)
Facility: LOCATION | Age: 44
Setting detail: OPHTHALMOLOGY
End: 2024-12-19
Payer: COMMERCIAL

## 2024-12-19 DIAGNOSIS — H35.371: ICD-10-CM

## 2024-12-19 DIAGNOSIS — H43.391: ICD-10-CM

## 2024-12-19 DIAGNOSIS — H20.00: ICD-10-CM

## 2024-12-19 PROCEDURE — 92134 CPTRZ OPH DX IMG PST SGM RTA: CPT | Performed by: OPHTHALMOLOGY

## 2024-12-19 PROCEDURE — 92012 INTRM OPH EXAM EST PATIENT: CPT | Performed by: OPHTHALMOLOGY

## 2024-12-19 PROCEDURE — 92201 OPSCPY EXTND RTA DRAW UNI/BI: CPT | Performed by: OPHTHALMOLOGY

## 2024-12-19 ASSESSMENT — VISUAL ACUITY
OD_BCVA: 20/20-2
OS_BCVA: 20/250

## 2024-12-19 ASSESSMENT — REFRACTION_AUTOREFRACTION
OS_SPHERE: +0.25
OD_SPHERE: -0.50
OS_CYLINDER: -0.25
OS_AXIS: 104
OD_AXIS: 074
OD_CYLINDER: -2.50

## 2024-12-19 ASSESSMENT — KERATOMETRY
OS_K2POWER_DIOPTERS: 46.42
OD_AXISANGLE_DEGREES: 070
OS_AXISANGLE_DEGREES: 098
OD_K1POWER_DIOPTERS: 33.55
OS_K1POWER_DIOPTERS: 46.23
OD_K2POWER_DIOPTERS: 48.01

## 2024-12-19 ASSESSMENT — SUPERFICIAL PUNCTATE KERATITIS (SPK)
OD_SPK: 1+ 2+
OS_SPK: 1+ 2+

## 2024-12-19 ASSESSMENT — CORNEAL SURGICAL SCARRING: OD_SCARRING: MID PERIPHERAL STROMAL

## 2024-12-23 ENCOUNTER — RX ONLY (RX ONLY)
Age: 44
End: 2024-12-23

## 2024-12-23 ENCOUNTER — OFFICE (OUTPATIENT)
Dept: URBAN - METROPOLITAN AREA CLINIC 104 | Facility: CLINIC | Age: 44
Setting detail: OPHTHALMOLOGY
End: 2024-12-23
Payer: COMMERCIAL

## 2024-12-23 DIAGNOSIS — H26.491: ICD-10-CM

## 2024-12-23 DIAGNOSIS — H16.223: ICD-10-CM

## 2024-12-23 DIAGNOSIS — Z96.1: ICD-10-CM

## 2024-12-23 DIAGNOSIS — H40.041: ICD-10-CM

## 2024-12-23 DIAGNOSIS — H17.9: ICD-10-CM

## 2024-12-23 PROBLEM — H43.391 VITREOUS FLOATERS; RIGHT EYE: Status: ACTIVE | Noted: 2024-12-19

## 2024-12-23 PROBLEM — H33.022 RETINAL DETACHMENT WITH MULTIPLE BREAKS; LEFT EYE: Status: ACTIVE | Noted: 2024-12-23

## 2024-12-23 PROCEDURE — 99213 OFFICE O/P EST LOW 20 MIN: CPT | Performed by: OPHTHALMOLOGY

## 2024-12-23 ASSESSMENT — KERATOMETRY
OD_AXISANGLE_DEGREES: 070
OD_K1POWER_DIOPTERS: 33.55
OD_K2POWER_DIOPTERS: 48.01
OS_K2POWER_DIOPTERS: 46.42
OS_AXISANGLE_DEGREES: 098
OS_K1POWER_DIOPTERS: 46.23

## 2024-12-23 ASSESSMENT — REFRACTION_AUTOREFRACTION
OS_CYLINDER: -0.25
OD_CYLINDER: -2.50
OS_AXIS: 104
OS_SPHERE: +0.25
OD_AXIS: 074
OD_SPHERE: -0.50

## 2024-12-23 ASSESSMENT — TONOMETRY
OS_IOP_MMHG: 12
OD_IOP_MMHG: 16

## 2024-12-23 ASSESSMENT — SUPERFICIAL PUNCTATE KERATITIS (SPK)
OS_SPK: 1+
OD_SPK: 1+

## 2024-12-23 ASSESSMENT — CONFRONTATIONAL VISUAL FIELD TEST (CVF)
OS_FINDINGS: FULL
OD_FINDINGS: FULL

## 2024-12-23 ASSESSMENT — VISUAL ACUITY
OS_BCVA: 20/200
OD_BCVA: 20/20

## 2024-12-23 ASSESSMENT — CORNEAL SURGICAL SCARRING: OD_SCARRING: MID PERIPHERAL STROMAL

## 2025-01-20 ENCOUNTER — DOCTOR'S OFFICE (OUTPATIENT)
Facility: LOCATION | Age: 45
Setting detail: OPHTHALMOLOGY
End: 2025-01-20
Payer: COMMERCIAL

## 2025-01-20 ENCOUNTER — RX ONLY (RX ONLY)
Age: 45
End: 2025-01-20

## 2025-01-20 DIAGNOSIS — H20.00: ICD-10-CM

## 2025-01-20 DIAGNOSIS — H35.371: ICD-10-CM

## 2025-01-20 DIAGNOSIS — H26.491: ICD-10-CM

## 2025-01-20 DIAGNOSIS — Z96.1: ICD-10-CM

## 2025-01-20 DIAGNOSIS — H16.223: ICD-10-CM

## 2025-01-20 DIAGNOSIS — H17.9: ICD-10-CM

## 2025-01-20 DIAGNOSIS — H43.391: ICD-10-CM

## 2025-01-20 PROCEDURE — 92012 INTRM OPH EXAM EST PATIENT: CPT | Performed by: OPHTHALMOLOGY

## 2025-01-20 PROCEDURE — 92134 CPTRZ OPH DX IMG PST SGM RTA: CPT | Performed by: OPHTHALMOLOGY

## 2025-01-20 ASSESSMENT — KERATOMETRY
OD_AXISANGLE_DEGREES: 070
OS_K2POWER_DIOPTERS: 46.42
OS_K1POWER_DIOPTERS: 46.23
OD_K2POWER_DIOPTERS: 48.01
OS_AXISANGLE_DEGREES: 098
OD_K1POWER_DIOPTERS: 33.55

## 2025-01-20 ASSESSMENT — REFRACTION_AUTOREFRACTION
OS_CYLINDER: -0.25
OS_SPHERE: +0.25
OD_CYLINDER: -2.50
OD_SPHERE: -0.50
OD_AXIS: 074
OS_AXIS: 104

## 2025-01-20 ASSESSMENT — VISUAL ACUITY
OD_BCVA: 20/20-1
OS_BCVA: 20/400

## 2025-01-20 ASSESSMENT — SUPERFICIAL PUNCTATE KERATITIS (SPK)
OD_SPK: 1+
OS_SPK: 1+

## 2025-01-20 ASSESSMENT — CORNEAL SURGICAL SCARRING: OD_SCARRING: MID PERIPHERAL STROMAL

## 2025-01-21 ENCOUNTER — OFFICE (OUTPATIENT)
Dept: URBAN - METROPOLITAN AREA CLINIC 116 | Facility: CLINIC | Age: 45
Setting detail: OPHTHALMOLOGY
End: 2025-01-21
Payer: COMMERCIAL

## 2025-01-21 DIAGNOSIS — H17.9: ICD-10-CM

## 2025-01-21 PROBLEM — H33.022 RETINAL DETACHMENT WITH MULTIPLE BREAKS; LEFT EYE: Status: ACTIVE | Noted: 2025-01-21

## 2025-01-21 PROCEDURE — 99212 OFFICE O/P EST SF 10 MIN: CPT | Performed by: OPTOMETRIST

## 2025-01-21 ASSESSMENT — KERATOMETRY
OD_AXISANGLE_DEGREES: 178
OS_K2POWER_DIOPTERS: 46.25
OS_K1POWER_DIOPTERS: 46.25
OD_K1POWER_DIOPTERS: 45.00
OS_AXISANGLE_DEGREES: 090
OD_K2POWER_DIOPTERS: 48.50

## 2025-01-21 ASSESSMENT — TONOMETRY
OS_IOP_MMHG: 16
OD_IOP_MMHG: 16

## 2025-01-21 ASSESSMENT — VISUAL ACUITY
OD_BCVA: 20/20-1
OS_BCVA: 20/400

## 2025-01-21 ASSESSMENT — CONFRONTATIONAL VISUAL FIELD TEST (CVF)
OS_FINDINGS: FULL
OD_FINDINGS: FULL

## 2025-01-21 ASSESSMENT — SUPERFICIAL PUNCTATE KERATITIS (SPK)
OD_SPK: 1+
OS_SPK: 1+

## 2025-01-21 ASSESSMENT — REFRACTION_AUTOREFRACTION
OD_CYLINDER: -4.75
OS_AXIS: 095
OS_SPHERE: +0.50
OS_CYLINDER: -0.25
OD_AXIS: 065

## 2025-01-21 ASSESSMENT — CORNEAL SURGICAL SCARRING: OD_SCARRING: MID PERIPHERAL STROMAL

## 2025-02-24 ENCOUNTER — RX ONLY (RX ONLY)
Age: 45
End: 2025-02-24

## 2025-02-24 ENCOUNTER — DOCTOR'S OFFICE (OUTPATIENT)
Facility: LOCATION | Age: 45
Setting detail: OPHTHALMOLOGY
End: 2025-02-24
Payer: COMMERCIAL

## 2025-02-24 DIAGNOSIS — H35.371: ICD-10-CM

## 2025-02-24 DIAGNOSIS — H43.812: ICD-10-CM

## 2025-02-24 DIAGNOSIS — H35.411: ICD-10-CM

## 2025-02-24 DIAGNOSIS — H33.022: ICD-10-CM

## 2025-02-24 DIAGNOSIS — H33.311: ICD-10-CM

## 2025-02-24 DIAGNOSIS — H43.391: ICD-10-CM

## 2025-02-24 DIAGNOSIS — H20.00: ICD-10-CM

## 2025-02-24 DIAGNOSIS — H33.021: ICD-10-CM

## 2025-02-24 PROCEDURE — 92014 COMPRE OPH EXAM EST PT 1/>: CPT | Performed by: OPHTHALMOLOGY

## 2025-02-24 PROCEDURE — 92134 CPTRZ OPH DX IMG PST SGM RTA: CPT | Performed by: OPHTHALMOLOGY

## 2025-02-24 ASSESSMENT — KERATOMETRY
OS_AXISANGLE_DEGREES: 090
OD_K2POWER_DIOPTERS: 48.50
OS_K2POWER_DIOPTERS: 46.25
OS_K1POWER_DIOPTERS: 46.25
OD_AXISANGLE_DEGREES: 178
OD_K1POWER_DIOPTERS: 45.00

## 2025-02-24 ASSESSMENT — VISUAL ACUITY
OD_BCVA: 20/20
OS_BCVA: 20/250

## 2025-02-24 ASSESSMENT — REFRACTION_AUTOREFRACTION
OS_AXIS: 095
OS_SPHERE: +0.50
OD_CYLINDER: -4.75
OS_CYLINDER: -0.25
OD_AXIS: 065

## 2025-02-24 ASSESSMENT — CONFRONTATIONAL VISUAL FIELD TEST (CVF)
OS_FINDINGS: FULL
OD_FINDINGS: FULL

## 2025-02-24 ASSESSMENT — SUPERFICIAL PUNCTATE KERATITIS (SPK)
OS_SPK: 1+
OD_SPK: 1+

## 2025-02-24 ASSESSMENT — CORNEAL SURGICAL SCARRING: OD_SCARRING: MID PERIPHERAL STROMAL

## 2025-03-17 ENCOUNTER — OFFICE (OUTPATIENT)
Dept: URBAN - METROPOLITAN AREA CLINIC 104 | Facility: CLINIC | Age: 45
Setting detail: OPHTHALMOLOGY
End: 2025-03-17
Payer: COMMERCIAL

## 2025-03-17 DIAGNOSIS — Z96.1: ICD-10-CM

## 2025-03-17 DIAGNOSIS — H16.223: ICD-10-CM

## 2025-03-17 DIAGNOSIS — H17.9: ICD-10-CM

## 2025-03-17 DIAGNOSIS — H40.041: ICD-10-CM

## 2025-03-17 DIAGNOSIS — H16.231: ICD-10-CM

## 2025-03-17 DIAGNOSIS — H18.421: ICD-10-CM

## 2025-03-17 DIAGNOSIS — H16.8: ICD-10-CM

## 2025-03-17 DIAGNOSIS — H26.491: ICD-10-CM

## 2025-03-17 PROCEDURE — 99213 OFFICE O/P EST LOW 20 MIN: CPT | Performed by: OPHTHALMOLOGY

## 2025-03-17 PROCEDURE — 92250 FUNDUS PHOTOGRAPHY W/I&R: CPT | Performed by: OPHTHALMOLOGY

## 2025-03-17 ASSESSMENT — REFRACTION_AUTOREFRACTION
OS_CYLINDER: -0.25
OS_AXIS: 095
OD_CYLINDER: -4.75
OD_AXIS: 065
OS_SPHERE: +0.50

## 2025-03-17 ASSESSMENT — SUPERFICIAL PUNCTATE KERATITIS (SPK)
OS_SPK: 1+
OD_SPK: 1+

## 2025-03-17 ASSESSMENT — VISUAL ACUITY
OD_BCVA: 20/20
OS_BCVA: 20/250

## 2025-03-17 ASSESSMENT — KERATOMETRY
OD_K1POWER_DIOPTERS: 45.00
OS_K1POWER_DIOPTERS: 46.25
OD_AXISANGLE_DEGREES: 178
OD_K2POWER_DIOPTERS: 48.50
OS_AXISANGLE_DEGREES: 090
OS_K2POWER_DIOPTERS: 46.25

## 2025-03-17 ASSESSMENT — CORNEAL SURGICAL SCARRING: OD_SCARRING: MID PERIPHERAL STROMAL

## 2025-03-17 ASSESSMENT — TONOMETRY
OD_IOP_MMHG: 21
OS_IOP_MMHG: 20

## 2025-03-17 ASSESSMENT — CONFRONTATIONAL VISUAL FIELD TEST (CVF)
OD_FINDINGS: FULL
OS_FINDINGS: FULL

## 2025-03-24 ENCOUNTER — DOCTOR'S OFFICE (OUTPATIENT)
Facility: LOCATION | Age: 45
Setting detail: OPHTHALMOLOGY
End: 2025-03-24
Payer: COMMERCIAL

## 2025-03-24 DIAGNOSIS — H33.311: ICD-10-CM

## 2025-03-24 DIAGNOSIS — H20.00: ICD-10-CM

## 2025-03-24 DIAGNOSIS — H43.812: ICD-10-CM

## 2025-03-24 DIAGNOSIS — H33.021: ICD-10-CM

## 2025-03-24 DIAGNOSIS — H35.411: ICD-10-CM

## 2025-03-24 DIAGNOSIS — H33.022: ICD-10-CM

## 2025-03-24 DIAGNOSIS — H35.371: ICD-10-CM

## 2025-03-24 DIAGNOSIS — H43.391: ICD-10-CM

## 2025-03-24 PROCEDURE — 92012 INTRM OPH EXAM EST PATIENT: CPT | Performed by: OPHTHALMOLOGY

## 2025-03-24 PROCEDURE — 92134 CPTRZ OPH DX IMG PST SGM RTA: CPT | Performed by: OPHTHALMOLOGY

## 2025-03-24 ASSESSMENT — REFRACTION_AUTOREFRACTION
OS_AXIS: 095
OS_SPHERE: +0.50
OD_AXIS: 065
OD_CYLINDER: -4.75
OS_CYLINDER: -0.25

## 2025-03-24 ASSESSMENT — SUPERFICIAL PUNCTATE KERATITIS (SPK)
OD_SPK: 1+
OS_SPK: 1+

## 2025-03-24 ASSESSMENT — CONFRONTATIONAL VISUAL FIELD TEST (CVF)
OD_FINDINGS: FULL
OS_FINDINGS: FULL

## 2025-03-24 ASSESSMENT — KERATOMETRY
OD_K2POWER_DIOPTERS: 48.50
OS_K1POWER_DIOPTERS: 46.25
OD_K1POWER_DIOPTERS: 45.00
OS_AXISANGLE_DEGREES: 090
OD_AXISANGLE_DEGREES: 178
OS_K2POWER_DIOPTERS: 46.25

## 2025-03-24 ASSESSMENT — VISUAL ACUITY
OS_BCVA: 20/250
OD_BCVA: 20/20-1

## 2025-03-24 ASSESSMENT — CORNEAL SURGICAL SCARRING: OD_SCARRING: MID PERIPHERAL STROMAL

## 2025-04-24 ENCOUNTER — DOCTOR'S OFFICE (OUTPATIENT)
Facility: LOCATION | Age: 45
Setting detail: OPHTHALMOLOGY
End: 2025-04-24
Payer: COMMERCIAL

## 2025-04-24 DIAGNOSIS — H33.022: ICD-10-CM

## 2025-04-24 DIAGNOSIS — H33.021: ICD-10-CM

## 2025-04-24 DIAGNOSIS — H43.812: ICD-10-CM

## 2025-04-24 DIAGNOSIS — H20.00: ICD-10-CM

## 2025-04-24 DIAGNOSIS — H33.311: ICD-10-CM

## 2025-04-24 DIAGNOSIS — H35.411: ICD-10-CM

## 2025-04-24 DIAGNOSIS — H43.391: ICD-10-CM

## 2025-04-24 DIAGNOSIS — H35.371: ICD-10-CM

## 2025-04-24 PROCEDURE — 92012 INTRM OPH EXAM EST PATIENT: CPT | Performed by: OPHTHALMOLOGY

## 2025-04-24 PROCEDURE — 92134 CPTRZ OPH DX IMG PST SGM RTA: CPT | Performed by: OPHTHALMOLOGY

## 2025-04-24 ASSESSMENT — KERATOMETRY
OD_K1POWER_DIOPTERS: 45.00
OD_K2POWER_DIOPTERS: 48.50
OS_K1POWER_DIOPTERS: 46.25
OD_AXISANGLE_DEGREES: 178
OS_K2POWER_DIOPTERS: 46.25
OS_AXISANGLE_DEGREES: 090

## 2025-04-24 ASSESSMENT — CONFRONTATIONAL VISUAL FIELD TEST (CVF)
OS_FINDINGS: FULL
OD_FINDINGS: FULL

## 2025-04-24 ASSESSMENT — SUPERFICIAL PUNCTATE KERATITIS (SPK)
OD_SPK: 1+
OS_SPK: 1+

## 2025-04-24 ASSESSMENT — REFRACTION_AUTOREFRACTION
OS_AXIS: 095
OS_SPHERE: +0.50
OD_AXIS: 065
OS_CYLINDER: -0.25
OD_CYLINDER: -4.75

## 2025-04-24 ASSESSMENT — VISUAL ACUITY
OD_BCVA: 20/20-2
OS_BCVA: 20/200

## 2025-04-24 ASSESSMENT — CORNEAL SURGICAL SCARRING: OD_SCARRING: MID PERIPHERAL STROMAL

## 2025-06-05 ENCOUNTER — DOCTOR'S OFFICE (OUTPATIENT)
Facility: LOCATION | Age: 45
Setting detail: OPHTHALMOLOGY
End: 2025-06-05
Payer: COMMERCIAL

## 2025-06-05 DIAGNOSIS — H20.00: ICD-10-CM

## 2025-06-05 DIAGNOSIS — H43.812: ICD-10-CM

## 2025-06-05 DIAGNOSIS — H43.391: ICD-10-CM

## 2025-06-05 DIAGNOSIS — H35.371: ICD-10-CM

## 2025-06-05 DIAGNOSIS — H33.021: ICD-10-CM

## 2025-06-05 PROBLEM — H33.022 RETINAL DETACHMENT WITH MULTIPLE BREAKS; LEFT EYE: Status: ACTIVE | Noted: 2025-06-05

## 2025-06-05 PROCEDURE — 92134 CPTRZ OPH DX IMG PST SGM RTA: CPT | Performed by: OPHTHALMOLOGY

## 2025-06-05 PROCEDURE — 99212 OFFICE O/P EST SF 10 MIN: CPT | Performed by: OPHTHALMOLOGY

## 2025-06-05 ASSESSMENT — CORNEAL SURGICAL SCARRING: OD_SCARRING: MID PERIPHERAL STROMAL

## 2025-06-05 ASSESSMENT — VISUAL ACUITY
OD_BCVA: 20/20-1
OS_BCVA: 20/300

## 2025-06-05 ASSESSMENT — KERATOMETRY
OS_K1POWER_DIOPTERS: 46.25
OS_AXISANGLE_DEGREES: 090
OD_K2POWER_DIOPTERS: 48.50
OD_AXISANGLE_DEGREES: 178
OD_K1POWER_DIOPTERS: 45.00
OS_K2POWER_DIOPTERS: 46.25

## 2025-06-05 ASSESSMENT — REFRACTION_AUTOREFRACTION
OD_AXIS: 065
OD_CYLINDER: -4.75
OS_AXIS: 095
OS_SPHERE: +0.50
OS_CYLINDER: -0.25

## 2025-06-05 ASSESSMENT — SUPERFICIAL PUNCTATE KERATITIS (SPK)
OD_SPK: 1+
OS_SPK: 1+

## 2025-07-23 ENCOUNTER — RX ONLY (RX ONLY)
Age: 45
End: 2025-07-23

## 2025-07-23 RX ORDER — TIMOLOL MALEATE 5 MG/ML
SOLUTION/ DROPS OPHTHALMIC
Qty: 5 | Refills: 4 | Status: ACTIVE | OUTPATIENT

## 2025-07-23 RX ORDER — BROMFENAC SODIUM 0.7 MG/ML
SOLUTION/ DROPS OPHTHALMIC
Qty: 5 | Refills: 3 | Status: ACTIVE | OUTPATIENT

## 2025-07-24 ENCOUNTER — RX ONLY (RX ONLY)
Age: 45
End: 2025-07-24

## 2025-07-24 ENCOUNTER — DOCTOR'S OFFICE (OUTPATIENT)
Facility: LOCATION | Age: 45
Setting detail: OPHTHALMOLOGY
End: 2025-07-24
Payer: COMMERCIAL

## 2025-07-24 DIAGNOSIS — H33.021: ICD-10-CM

## 2025-07-24 DIAGNOSIS — H33.022: ICD-10-CM

## 2025-07-24 DIAGNOSIS — H20.00: ICD-10-CM

## 2025-07-24 DIAGNOSIS — H35.411: ICD-10-CM

## 2025-07-24 DIAGNOSIS — H43.391: ICD-10-CM

## 2025-07-24 DIAGNOSIS — H33.311: ICD-10-CM

## 2025-07-24 DIAGNOSIS — H43.812: ICD-10-CM

## 2025-07-24 DIAGNOSIS — H35.371: ICD-10-CM

## 2025-07-24 PROCEDURE — 92134 CPTRZ OPH DX IMG PST SGM RTA: CPT | Performed by: OPHTHALMOLOGY

## 2025-07-24 PROCEDURE — 99213 OFFICE O/P EST LOW 20 MIN: CPT | Performed by: OPHTHALMOLOGY

## 2025-07-24 RX ORDER — DORZOLAMIDE HYDROCHLORIDE AND TIMOLOL MALEATE 20; 5 MG/ML; MG/ML
SOLUTION OPHTHALMIC
Qty: 1 | Refills: 3 | Status: ACTIVE | OUTPATIENT

## 2025-07-24 ASSESSMENT — CORNEAL SURGICAL SCARRING: OD_SCARRING: MID PERIPHERAL STROMAL

## 2025-07-24 ASSESSMENT — SUPERFICIAL PUNCTATE KERATITIS (SPK)
OS_SPK: 1+
OD_SPK: 1+

## 2025-07-25 PROBLEM — H33.022 RETINAL DETACHMENT WITH MULTIPLE BREAKS; LEFT EYE: Status: ACTIVE | Noted: 2025-07-24

## 2025-07-25 ASSESSMENT — REFRACTION_AUTOREFRACTION
OS_CYLINDER: -0.25
OD_CYLINDER: -4.75
OS_AXIS: 095
OD_AXIS: 065
OS_SPHERE: +0.50

## 2025-07-25 ASSESSMENT — VISUAL ACUITY
OD_BCVA: 20/20
OS_BCVA: 20/300

## 2025-07-25 ASSESSMENT — KERATOMETRY
OD_K2POWER_DIOPTERS: 48.50
OS_K1POWER_DIOPTERS: 46.25
OS_AXISANGLE_DEGREES: 090
OD_K1POWER_DIOPTERS: 45.00
OS_K2POWER_DIOPTERS: 46.25
OD_AXISANGLE_DEGREES: 178

## (undated) DEVICE — Device

## (undated) DEVICE — LIGHT SHIELD CORNEAL

## (undated) DEVICE — SOL IRR BAL SALT + 500ML

## (undated) DEVICE — BLANKET WARMER LOWER ADULT

## (undated) DEVICE — PROBE TIP DIATHERMY DISP TAPR 25GA

## (undated) DEVICE — SYE-CONSTELLATION MACHINE 1003466901X: Type: DURABLE MEDICAL EQUIPMENT

## (undated) DEVICE — DRAPE STERI-DRAPE INCISE 13X13"

## (undated) DEVICE — ELCTR BIPOLAR CORD J&J 12FT DISP

## (undated) DEVICE — CANNULA MEDONE FLEXTIP 23G

## (undated) DEVICE — GLV 7.5 PROTEXIS

## (undated) DEVICE — WRAP COMPRESSION CALF MED

## (undated) DEVICE — CANNULA ALCON SOFT TIP 23G

## (undated) DEVICE — CONSTELLATION TOTAL PLUS PAK 23G

## (undated) DEVICE — DRSG MASTISOL

## (undated) DEVICE — NDL HYPO SAFE 22G X 1.5"

## (undated) DEVICE — SUT VICRYL 7-0 12" TG140-8 DA

## (undated) DEVICE — CONSTELLATION VFC PAK